# Patient Record
Sex: FEMALE | Race: OTHER | HISPANIC OR LATINO | URBAN - METROPOLITAN AREA
[De-identification: names, ages, dates, MRNs, and addresses within clinical notes are randomized per-mention and may not be internally consistent; named-entity substitution may affect disease eponyms.]

---

## 2021-12-03 ENCOUNTER — INPATIENT (INPATIENT)
Facility: HOSPITAL | Age: 31
LOS: 1 days | Discharge: HOME | DRG: 812 | End: 2021-12-05
Attending: INTERNAL MEDICINE | Admitting: FAMILY MEDICINE
Payer: SELF-PAY

## 2021-12-03 VITALS — HEIGHT: 63 IN | WEIGHT: 119.93 LBS

## 2021-12-03 DIAGNOSIS — D64.9 ANEMIA, UNSPECIFIED: ICD-10-CM

## 2021-12-03 DIAGNOSIS — Z98.891 HISTORY OF UTERINE SCAR FROM PREVIOUS SURGERY: Chronic | ICD-10-CM

## 2021-12-03 LAB
ADD ON TEST-SPECIMEN IN LAB: SIGNIFICANT CHANGE UP
ALBUMIN SERPL ELPH-MCNC: 4.1 G/DL — SIGNIFICANT CHANGE UP (ref 3.3–5)
ALP SERPL-CCNC: 55 U/L — SIGNIFICANT CHANGE UP (ref 40–120)
ALT FLD-CCNC: 32 U/L — SIGNIFICANT CHANGE UP (ref 12–78)
ANION GAP SERPL CALC-SCNC: 7 MMOL/L — SIGNIFICANT CHANGE UP (ref 5–17)
AST SERPL-CCNC: 43 U/L — HIGH (ref 15–37)
BASOPHILS # BLD AUTO: 0.02 K/UL — SIGNIFICANT CHANGE UP (ref 0–0.2)
BASOPHILS NFR BLD AUTO: 0.5 % — SIGNIFICANT CHANGE UP (ref 0–2)
BILIRUB SERPL-MCNC: 0.3 MG/DL — SIGNIFICANT CHANGE UP (ref 0.2–1.2)
BUN SERPL-MCNC: 10 MG/DL — SIGNIFICANT CHANGE UP (ref 7–23)
CALCIUM SERPL-MCNC: 8.8 MG/DL — SIGNIFICANT CHANGE UP (ref 8.5–10.1)
CHLORIDE SERPL-SCNC: 105 MMOL/L — SIGNIFICANT CHANGE UP (ref 96–108)
CO2 SERPL-SCNC: 24 MMOL/L — SIGNIFICANT CHANGE UP (ref 22–31)
CREAT SERPL-MCNC: 0.61 MG/DL — SIGNIFICANT CHANGE UP (ref 0.5–1.3)
EOSINOPHIL # BLD AUTO: 0.03 K/UL — SIGNIFICANT CHANGE UP (ref 0–0.5)
EOSINOPHIL NFR BLD AUTO: 0.8 % — SIGNIFICANT CHANGE UP (ref 0–6)
GLUCOSE SERPL-MCNC: 90 MG/DL — SIGNIFICANT CHANGE UP (ref 70–99)
HCT VFR BLD CALC: 23.3 % — LOW (ref 34.5–45)
HGB BLD-MCNC: 5.8 G/DL — CRITICAL LOW (ref 11.5–15.5)
IMM GRANULOCYTES NFR BLD AUTO: 0.3 % — SIGNIFICANT CHANGE UP (ref 0–1.5)
LYMPHOCYTES # BLD AUTO: 1.08 K/UL — SIGNIFICANT CHANGE UP (ref 1–3.3)
LYMPHOCYTES # BLD AUTO: 28.3 % — SIGNIFICANT CHANGE UP (ref 13–44)
MAGNESIUM SERPL-MCNC: 1.7 MG/DL — SIGNIFICANT CHANGE UP (ref 1.6–2.6)
MCHC RBC-ENTMCNC: 17.4 PG — LOW (ref 27–34)
MCHC RBC-ENTMCNC: 24.9 GM/DL — LOW (ref 32–36)
MCV RBC AUTO: 70 FL — LOW (ref 80–100)
MONOCYTES # BLD AUTO: 0.42 K/UL — SIGNIFICANT CHANGE UP (ref 0–0.9)
MONOCYTES NFR BLD AUTO: 11 % — SIGNIFICANT CHANGE UP (ref 2–14)
NEUTROPHILS # BLD AUTO: 2.26 K/UL — SIGNIFICANT CHANGE UP (ref 1.8–7.4)
NEUTROPHILS NFR BLD AUTO: 59.1 % — SIGNIFICANT CHANGE UP (ref 43–77)
NT-PROBNP SERPL-SCNC: 62 PG/ML — SIGNIFICANT CHANGE UP (ref 0–125)
PLATELET # BLD AUTO: 382 K/UL — SIGNIFICANT CHANGE UP (ref 150–400)
POTASSIUM SERPL-MCNC: 3.7 MMOL/L — SIGNIFICANT CHANGE UP (ref 3.5–5.3)
POTASSIUM SERPL-SCNC: 3.7 MMOL/L — SIGNIFICANT CHANGE UP (ref 3.5–5.3)
PROT SERPL-MCNC: 9.4 GM/DL — HIGH (ref 6–8.3)
RBC # BLD: 3.33 M/UL — LOW (ref 3.8–5.2)
RBC # FLD: 18.6 % — HIGH (ref 10.3–14.5)
SARS-COV-2 RNA SPEC QL NAA+PROBE: SIGNIFICANT CHANGE UP
SODIUM SERPL-SCNC: 136 MMOL/L — SIGNIFICANT CHANGE UP (ref 135–145)
TROPONIN I, HIGH SENSITIVITY RESULT: 9.06 NG/L — SIGNIFICANT CHANGE UP
WBC # BLD: 3.82 K/UL — SIGNIFICANT CHANGE UP (ref 3.8–10.5)
WBC # FLD AUTO: 3.82 K/UL — SIGNIFICANT CHANGE UP (ref 3.8–10.5)

## 2021-12-03 PROCEDURE — 86921 COMPATIBILITY TEST INCUBATE: CPT

## 2021-12-03 PROCEDURE — 99285 EMERGENCY DEPT VISIT HI MDM: CPT

## 2021-12-03 PROCEDURE — 86870 RBC ANTIBODY IDENTIFICATION: CPT

## 2021-12-03 PROCEDURE — 83735 ASSAY OF MAGNESIUM: CPT

## 2021-12-03 PROCEDURE — 84100 ASSAY OF PHOSPHORUS: CPT

## 2021-12-03 PROCEDURE — 83540 ASSAY OF IRON: CPT

## 2021-12-03 PROCEDURE — 83550 IRON BINDING TEST: CPT

## 2021-12-03 PROCEDURE — P9016: CPT

## 2021-12-03 PROCEDURE — 82728 ASSAY OF FERRITIN: CPT

## 2021-12-03 PROCEDURE — 84702 CHORIONIC GONADOTROPIN TEST: CPT

## 2021-12-03 PROCEDURE — 84443 ASSAY THYROID STIM HORMONE: CPT

## 2021-12-03 PROCEDURE — 85730 THROMBOPLASTIN TIME PARTIAL: CPT

## 2021-12-03 PROCEDURE — 86922 COMPATIBILITY TEST ANTIGLOB: CPT

## 2021-12-03 PROCEDURE — 36415 COLL VENOUS BLD VENIPUNCTURE: CPT

## 2021-12-03 PROCEDURE — 86039 ANTINUCLEAR ANTIBODIES (ANA): CPT

## 2021-12-03 PROCEDURE — 85610 PROTHROMBIN TIME: CPT

## 2021-12-03 PROCEDURE — 82746 ASSAY OF FOLIC ACID SERUM: CPT

## 2021-12-03 PROCEDURE — 82607 VITAMIN B-12: CPT

## 2021-12-03 PROCEDURE — 85027 COMPLETE CBC AUTOMATED: CPT

## 2021-12-03 PROCEDURE — 93010 ELECTROCARDIOGRAM REPORT: CPT

## 2021-12-03 PROCEDURE — 85025 COMPLETE CBC W/AUTO DIFF WBC: CPT

## 2021-12-03 PROCEDURE — 86200 CCP ANTIBODY: CPT

## 2021-12-03 PROCEDURE — 80053 COMPREHEN METABOLIC PANEL: CPT

## 2021-12-03 PROCEDURE — 86902 BLOOD TYPE ANTIGEN DONOR EA: CPT

## 2021-12-03 PROCEDURE — 84466 ASSAY OF TRANSFERRIN: CPT

## 2021-12-03 PROCEDURE — 71045 X-RAY EXAM CHEST 1 VIEW: CPT | Mod: 26

## 2021-12-03 PROCEDURE — 86431 RHEUMATOID FACTOR QUANT: CPT

## 2021-12-03 PROCEDURE — 36430 TRANSFUSION BLD/BLD COMPNT: CPT

## 2021-12-03 PROCEDURE — 82533 TOTAL CORTISOL: CPT

## 2021-12-03 PROCEDURE — 83020 HEMOGLOBIN ELECTROPHORESIS: CPT | Mod: 26

## 2021-12-03 PROCEDURE — 86920 COMPATIBILITY TEST SPIN: CPT

## 2021-12-03 PROCEDURE — 99223 1ST HOSP IP/OBS HIGH 75: CPT

## 2021-12-03 PROCEDURE — 83020 HEMOGLOBIN ELECTROPHORESIS: CPT

## 2021-12-03 PROCEDURE — 76856 US EXAM PELVIC COMPLETE: CPT

## 2021-12-03 RX ORDER — DIPHENHYDRAMINE HCL 50 MG
25 CAPSULE ORAL ONCE
Refills: 0 | Status: DISCONTINUED | OUTPATIENT
Start: 2021-12-03 | End: 2021-12-03

## 2021-12-03 RX ORDER — SODIUM CHLORIDE 9 MG/ML
250 INJECTION INTRAMUSCULAR; INTRAVENOUS; SUBCUTANEOUS ONCE
Refills: 0 | Status: COMPLETED | OUTPATIENT
Start: 2021-12-03 | End: 2021-12-03

## 2021-12-03 RX ADMIN — SODIUM CHLORIDE 250 MILLILITER(S): 9 INJECTION INTRAMUSCULAR; INTRAVENOUS; SUBCUTANEOUS at 18:30

## 2021-12-03 RX ADMIN — SODIUM CHLORIDE 500 MILLILITER(S): 9 INJECTION INTRAMUSCULAR; INTRAVENOUS; SUBCUTANEOUS at 17:30

## 2021-12-03 NOTE — H&P ADULT - NSICDXFAMILYHX_GEN_ALL_CORE_FT
FAMILY HISTORY:  FH: diabetes mellitus    Father  Still living? Unknown  Family history of hypertension, Age at diagnosis: Age Unknown    Mother  Still living? Unknown  Family history of hypothyroidism, Age at diagnosis: Age Unknown    Sibling  Still living? Unknown  Family history of hypothyroidism, Age at diagnosis: Age Unknown    Aunt  Still living? Unknown  Family history of hypothyroidism, Age at diagnosis: Age Unknown

## 2021-12-03 NOTE — ED ADULT NURSE NOTE - OBJECTIVE STATEMENT
pt presents to ED s/p low H&H performed at [facility]. Pt endorses ongoing fatigue, SOB and dizziness. Hx anemia and 3 blood transfusions. Reports that patient has chronic anemia from heavy long lasting menstruation. Pt denies menstruating at this time. Pt on the monitor, in no acute distress, will ctm

## 2021-12-03 NOTE — ED PROVIDER NOTE - NS_ ATTENDINGSCRIBEDETAILS _ED_A_ED_FT
I Mark Marx MD saw and examined the patient. Scribe documented for me and under my supervision. I have modified the scribe's documentation where necessary to reflect my history, physical exam and other relevant documentations pertinent to the care of the patient.

## 2021-12-03 NOTE — H&P ADULT - NSHPREVIEWOFSYSTEMS_GEN_ALL_CORE
Gen: + fevers, chills, weight loss, weight gain, malaise, fatigue  Eyes: + some photophobia. No blurred vision or lacrimation  ENT: no tinnitus or decreased hearing  Resp: +dyspnea. No wheezing, pleuritic chest pain, or hemoptysis  CV: +GARZA. No chest pain or palpitations  GI: no nausea, vomiting, abdominal pain, diarrhea, constipation, melena, or hematochezia  : no dysuria, hematuria, or incontinence  MSK: + arthralgias. No joint swelling or myalgias  Neuro: + headache, dizziness, weakness. No focal deficits, confusion, tremors, or seizures  Skin: no rash, lesions, or edema

## 2021-12-03 NOTE — H&P ADULT - ASSESSMENT
32 yo mostly Georgian-speaking F with a PMH reported iron deficiency anemia and hypothyroidism (not on meds) who presents with SOB and dizziness/SOB, found to be significantly anemic.    #Symptomatic (Microcytic) Anemia/Metrorrhagia  - Patient has had longstanding anemia for years  - Reportedly iron deficiency due to menorrhagia  - Now having aberrant menstrual periods, which may be in the setting of anemia, but would recommend pelvic US regardless  - Patient presented with Hb 5.8, appears to be at baseline, but is very symptomatic with dyspnea on exertion and at rest, dizziness, weight changes, and fatigue  - MCV low at 70  - LDH mildly elevated at 277  - F/u haptoglobin and reticulocyte count  - FOBT negative  - F/u ferritin. Added iron studies prior to transfusion  - Check B12 and folate  - Check hemoglobin electrophoresis to evaluate for thalassemia  - F/u TSH (not on meds)  - No other cytopenias seen  - Would ordinarily transfuse for symptomatic anemia, but patient is Silvano positive (IgG positive, complement negative) and has numerous antibodies (eluate panagglutinin). ED spoke with blood bank, who would need to type and cross patient with the correct blood and will take 1-2 days (has to be sent out to the city). Currently patient is stable and can be admitted to Med/Surg with Q4H vitals monitoring. However, if patient clinically decompensates (i.e worsening SOB, dizziness), then may need more urgent transfusion and would consider transfusing type O blood in a closely monitored setting (i.e. CICU)  - No prior transfusion reactions  - Given antibodies above, may well have AIHA, but unclear if this is the major cause for her anemia vs minor  - Peripheral smear _________________  - Hematology consult    #Hypothyroidism  - Reportedly  - May be playing a role in anemia. Not on meds  - Check TSH    #Prophylactic measure  - DVT PPX: IMPROVE score of 0, no PPX needed  - Diet: regular  - Dispo: pending transfusion and hematology eval 32 yo mostly Jordanian-speaking F with a PMH reported iron deficiency anemia and hypothyroidism (not on meds) who presents with SOB and dizziness/SOB, found to be significantly anemic.    #Symptomatic (Microcytic) Anemia/Metrorrhagia  - Patient has had longstanding anemia for years  - Reportedly iron deficiency due to menorrhagia  - Now having aberrant menstrual periods, which may be in the setting of anemia, but would recommend pelvic US regardless  - Patient presented with Hb 5.8, appears to be at baseline, but is very symptomatic with dyspnea on exertion and at rest, dizziness, weight changes, and fatigue  - MCV low at 70  - LDH mildly elevated at 277  - F/u haptoglobin and reticulocyte count  - FOBT negative  - F/u ferritin. Added iron studies prior to transfusion  - Check B12 and folate  - Check hemoglobin electrophoresis to evaluate for thalassemia  - F/u TSH (not on meds)  - No other cytopenias seen  - Would ordinarily transfuse for symptomatic anemia, but patient is Silvano positive (IgG positive, complement negative) and has numerous antibodies (eluate panagglutinin). ED spoke with blood bank, who would need to type and cross patient with the correct blood and will take 1-2 days (has to be sent out to the city). Currently patient is stable and can be admitted to Med/Surg with Q4H vitals monitoring. However, if patient clinically decompensates (i.e worsening SOB, dizziness), then may need more urgent transfusion and would consider transfusing type O blood in a closely monitored setting (i.e. CICU)  - No prior transfusion reactions  - Given antibodies above, may well have AIHA, but unclear if this is the major cause for her anemia vs minor  - Peripheral smear _________________  - Hematology consult    #Hypothyroidism  - Reportedly  - May be playing a role in anemia. Not on meds  - Check TSH    #Intermittent Fever  - Patient with reported fevers as high as 103F outpatient  - With frequent joint pains  - Unclear if may be related to irregular menstruation vs thyroid disease  - WBC normal  - Check TSH  - Check OZZY, rheumatoid factor, anti-CCP  - Check AM cortisol levels as well (has a history of hypotension)  - Vital signs Q4H    #Prophylactic measure  - DVT PPX: IMPROVE score of 0, no PPX needed  - Diet: regular  - Dispo: pending transfusion and hematology eval 30 yo mostly Togolese-speaking F with a PMH reported iron deficiency anemia and hypothyroidism (not on meds) who presents with SOB and dizziness/SOB, found to be significantly anemic.    #Symptomatic (Microcytic) Anemia/Metrorrhagia  - Patient has had longstanding anemia for years  - Reportedly iron deficiency due to menorrhagia  - Now having aberrant menstrual periods, which may be in the setting of anemia, but would recommend pelvic US regardless  - Patient presented with Hb 5.8, appears to be at baseline, but is very symptomatic with dyspnea on exertion and at rest, dizziness, weight changes, and fatigue  - MCV low at 70  - LDH mildly elevated at 277  - F/u haptoglobin and reticulocyte count  - FOBT negative  - F/u ferritin. Added iron studies prior to transfusion  - Check B12 and folate  - Check hemoglobin electrophoresis to evaluate for thalassemia  - F/u TSH (not on meds)  - No other cytopenias seen  - Peripheral smear personally reviewed. Microcytic, hypochromic cells with some poikilocytosis, elliptocytes. Occasional micr-spherocytes. No schistocytes. Occasional giant platelets as well. No dysplastic appearing WBCs seen.  - Would ordinarily transfuse for symptomatic anemia, but patient is Silvano positive (IgG positive, complement negative) and has numerous antibodies (eluate panagglutinin). ED spoke with blood bank, who would need to type and cross patient with the correct blood and will take 1-2 days (has to be sent out to the city). Currently patient is stable and can be admitted to Med/Surg with Q4H vitals monitoring. However, if patient clinically decompensates (i.e worsening SOB, dizziness), then may need more urgent transfusion and would consider transfusing type O blood in a closely monitored setting (i.e. CICU)  - No prior transfusion reactions  - Given antibodies above and peripheral smear, may well have AIHA, but unclear if this is the major cause for her anemia vs minor  - Hematology consult    #Hypothyroidism  - Reportedly  - May be playing a role in anemia. Not on meds  - Check TSH    #Intermittent Fever  - Patient with reported fevers as high as 103F outpatient  - With frequent joint pains  - Unclear if may be related to irregular menstruation vs thyroid disease  - WBC normal  - Check TSH  - Check OZZY, rheumatoid factor, anti-CCP  - Check AM cortisol levels as well (has a history of hypotension)  - Vital signs Q4H    #Prophylactic measure  - DVT PPX: IMPROVE score of 0, no PPX needed  - Diet: regular  - Dispo: pending transfusion and hematology eval

## 2021-12-03 NOTE — ED PROVIDER NOTE - CLINICAL SUMMARY MEDICAL DECISION MAKING FREE TEXT BOX
30 y/o female with a PMHx of 2x blood transfusion presents to the ED c/o low HGB level of 4.9. Plan: labs,  rectal exam (negative), CXR, brisk hydration and reassess. 32 y/o female with a PMHx of 2x blood transfusion presents to the ED c/o low HGB level of 4.9. Plan: labs,  rectal exam (negative), CXR, brisk hydration and reassess.    Araseli POSEY: Will admit as blood can not be readily crossed and matched, currently no hemodynamic instability so will admit pending transfusion.

## 2021-12-03 NOTE — H&P ADULT - NSHPLABSRESULTS_GEN_ALL_CORE
Labs personally reviewed and interpreted. Notable for no leukocytosis (WBC 3.82), left shift, or lymphopenia. Hb low at 5.8 with MCV 70, plt 382, Na 136, K 3.7, Cl 105, HCO3 24, BUN/creatinine 10/0.61, BG 90, calcium 8.8 with albumin 4.1, total protein elevated at 9.4, Mg 1.7, tbili normal at 0.3, alk phos normal at 55, AST slightly elevated at 43, ALT normal at 32, LDH slightly elevated at 277, uric acid normal at 3.1, and troponin I normal at 9.06 x1.  Patient is positive for direct Silvano antiglobulins, IgG positive and complement negative; eluate panagglutinin.  COVID-19 PCR result negative.    CXR personally reviewed and interpreted. Notable for clear lungs, no focal consolidations, effusions, interstitial markings, pneumothorax, or obvious cardiomegaly.    EKG personally reviewed. Normal sinus rhythm, normal axis. No pathological Q waves or ST changes. Rate _______________________ Labs personally reviewed and interpreted. Notable for no leukocytosis (WBC 3.82), left shift, or lymphopenia. Hb low at 5.8 with MCV 70, plt 382, Na 136, K 3.7, Cl 105, HCO3 24, BUN/creatinine 10/0.61, BG 90, calcium 8.8 with albumin 4.1, total protein elevated at 9.4, Mg 1.7, tbili normal at 0.3, alk phos normal at 55, AST slightly elevated at 43, ALT normal at 32, LDH slightly elevated at 277, uric acid normal at 3.1, and troponin I normal at 9.06 x1.  Patient is positive for direct Silvano antiglobulins, IgG positive and complement negative; eluate panagglutinin.  COVID-19 PCR result negative.    CXR personally reviewed and interpreted. Notable for clear lungs, no focal consolidations, effusions, interstitial markings, pneumothorax, or obvious cardiomegaly.    EKG personally reviewed. Normal sinus rhythm, normal axis. No pathological Q waves or ST elevations or depressions. TWIs in aVL and V1-4. Rate 76, , QTc 432.

## 2021-12-03 NOTE — PHARMACOTHERAPY INTERVENTION NOTE - COMMENTS
Medication History Complete. Medications and allergies reviewed with patient and compared to [place]. Al medication related questions answered.

## 2021-12-03 NOTE — ED PROVIDER NOTE - MUSCULOSKELETAL, MLM
Spine appears normal, range of motion is not limited, no muscle or joint tenderness Spine appears normal, range of motion is not limited, no muscle or joint tenderness. 5/5 strength on flexion and extension of all limbs. No nuchal rigidity. no saddle anesthesia.

## 2021-12-03 NOTE — ED PROVIDER NOTE - OBJECTIVE STATEMENT
30 y/o female with a PMHx of 2x blood transfusion presents to the ED c/o low HGB of 4.9 requesting blood transfusion. States she went to [facility] for fatigue and knee discomfort for the past few weeks.  Sent by [facility]. +SOB. Denies heavy vaginal bleeding, bleeding from rectum.  LMP in 08/2021 which lasts 6-9 days. No possibility of being pregnant.  Denies any testing for ID. Has a OBGYN in [place]. ED RN, Cheyenne Springer served as . 32 y/o female with a PMHx of 2x blood transfusion presents to the ED c/o low HGB of 4.9 requesting blood transfusion. States she went to [facility] for fatigue and knee discomfort for the past few weeks.  Sent by [facility]. +SOB. Denies heavy vaginal bleeding, bleeding from rectum.  LMP in 08/2021 which lasts 6-9 days. No possibility of being pregnant.  No tick bite. No recent dx of any infectious dx that she is aware of. Has a OBGYN in [place]. ED RN, Cheyenne Springer served as . No cp, palpitation, abdominal pain, recent trauma, visual or focal neurological complaints. No saddle anesthesia.

## 2021-12-03 NOTE — H&P ADULT - NSICDXPASTMEDICALHX_GEN_ALL_CORE_FT
PAST MEDICAL HISTORY:  Anemia reportedly iron deficiency anemia    Hypothyroidism not on meds    Low blood pressure history of      History of blood transfusion

## 2021-12-03 NOTE — ED PROVIDER NOTE - PROGRESS NOTE DETAILS
Bowen Collier for attending Dr. Marx: Guaiac test: Lot [address] , Exp:08/31/22 , Guaiac negative, QC passed. Araseli POSEY: Plan was to transfuse patient and dc patient, however lab contacted us that patient has multiple antibodies in the blood and they can not readily cross and match blood for patient and that patient would requires her blood sent to a specialized lab in the city to cross and match for the correct blood. The risk of blood transfusion reactions is high due to her cross and match issues. Patient also has SOB, and symptomatic anemia so can not just dc patient for this. Spoke with lab who states it might take up to a day for the results to return. Will admit patient to medicine as we can not board patient in the ED for a day or more, will admit pending transfusion and hematomlogy eval. Bowen Collier for attending Dr. Marx: Guaiac test: Lot [address] , Exp:08/31/22 , Guaiac negative, QC passed. Chaperoned by female staff/RN. Verbal consent prior and description of testing, patient agreeable, no complaint/discomfort during process. Araseli POSEY: Plan was to transfuse patient and dc patient, however lab contacted us that patient has multiple antibodies in the blood and they can not readily cross and match blood for patient and that patient would requires her blood sent to a specialized lab in the city to cross and match for the correct blood. The risk of blood transfusion reactions is high due to her cross and match issues. Patient also has SOB, and symptomatic anemia so can not just dc patient for this. Spoke with lab who states it might take up to a day for the results to return. Will admit patient to medicine as we can not board patient in the ED for a day or more, will admit pending transfusion and hematology eval. Hospitalist admission is appreciated.

## 2021-12-03 NOTE — ED ADULT NURSE REASSESSMENT NOTE - NS ED NURSE REASSESS COMMENT FT1
As per lab, pt requires further testing due to presence of antibodies present. As per valeriano henderson from labs extra labs drawn by phlebotomist will be spent to specialty lab in [facility] for evaluation. Pt will not be receiving blood transfusion overnight. MD delgado notified. pt aware and agreeable to admission. in no acute distress. will ctm As per lab, pt requires further testing due to presence of antibodies present. As per valeriano henderson from labs extra labs drawn by phlebotomist will be spent to specialty lab in [facility] for evaluation. Confirmatory type and screen not necessary at this time. Pt will not be receiving blood transfusion overnight. MD delgado notified. pt aware and agreeable to admission. in no acute distress. will ctm

## 2021-12-03 NOTE — ED PROVIDER NOTE - PLAN OF CARE
Unable to cross and match in the ED, multiple blood antibodies present, requires accurate type and match, patient not safe to go home as sympotomatic anemioa, also likely has hematological or other unknown reason for her to be so anemic will admit for further inpatient care and transfusion.

## 2021-12-03 NOTE — ED PROVIDER NOTE - CARE PLAN
Principal Discharge DX:	Symptomatic anemia   1 Principal Discharge DX:	Symptomatic anemia  Goal:	Unable to cross and match in the ED, multiple blood antibodies present, requires accurate type and match, patient not safe to go home as sympotomatic anemioa, also likely has hematological or other unknown reason for her to be so anemic will admit for further inpatient care and transfusion.

## 2021-12-03 NOTE — H&P ADULT - NSHPPHYSICALEXAM_GEN_ALL_CORE
Vital Signs Last 24 Hrs  T(C): 36.9 (03 Dec 2021 22:26), Max: 36.9 (03 Dec 2021 16:39)  T(F): 98.4 (03 Dec 2021 22:26), Max: 98.5 (03 Dec 2021 16:39)  HR: 86 (03 Dec 2021 22:26) (79 - 86)  BP: 113/63 (03 Dec 2021 22:26) (113/63 - 118/77)  BP(mean): 91 (03 Dec 2021 16:39) (91 - 91)  RR: 18 (03 Dec 2021 16:39) (18 - 18)  SpO2: 100% (03 Dec 2021 16:39) (100% - 100%)    GENERAL: No acute distress  HEENT: PERRL, EOMI, MMM, no oropharyngeal lesions  NECK: supple, no stiffness, no JVD, no thyromegaly  PULM: respirations non-labored, clear to auscultation bilaterally, no rales, rhonchi, or wheezes  CV: regular rate and rhythm, no murmurs, gallops, or rubs  GI: abdomen soft, nontender, nondistended, no masses felt, normal bowel sounds  MSK: strength 5/5 bilateral upper/lower extremities. No joint swelling, erythema, or warmth.  LYMPH: no anterior cervical, posterior cervical, supraclavicular, or inguinal lymphadenopathy  NEURO: A&Ox3, no tremors, sensation intact  SKIN: no rashes, lesions, or edema

## 2021-12-03 NOTE — ED ADULT TRIAGE NOTE - CHIEF COMPLAINT QUOTE
pt ambulatory to ED c/o low HGB 4.9, sent in by [facility] for transfusion. +SOB and lethargy. hx of blood transfusion

## 2021-12-04 DIAGNOSIS — D64.9 ANEMIA, UNSPECIFIED: ICD-10-CM

## 2021-12-04 DIAGNOSIS — R76.8 OTHER SPECIFIED ABNORMAL IMMUNOLOGICAL FINDINGS IN SERUM: ICD-10-CM

## 2021-12-04 DIAGNOSIS — D50.0 IRON DEFICIENCY ANEMIA SECONDARY TO BLOOD LOSS (CHRONIC): ICD-10-CM

## 2021-12-04 DIAGNOSIS — R42 DIZZINESS AND GIDDINESS: ICD-10-CM

## 2021-12-04 LAB
ADD ON TEST-SPECIMEN IN LAB: SIGNIFICANT CHANGE UP
ADD ON TEST-SPECIMEN IN LAB: SIGNIFICANT CHANGE UP
ALBUMIN SERPL ELPH-MCNC: 3.6 G/DL — SIGNIFICANT CHANGE UP (ref 3.3–5)
ALP SERPL-CCNC: 52 U/L — SIGNIFICANT CHANGE UP (ref 40–120)
ALT FLD-CCNC: 28 U/L — SIGNIFICANT CHANGE UP (ref 12–78)
ANION GAP SERPL CALC-SCNC: 7 MMOL/L — SIGNIFICANT CHANGE UP (ref 5–17)
APTT BLD: 29.6 SEC — SIGNIFICANT CHANGE UP (ref 27.5–35.5)
AST SERPL-CCNC: 25 U/L — SIGNIFICANT CHANGE UP (ref 15–37)
BASOPHILS # BLD AUTO: 0.01 K/UL — SIGNIFICANT CHANGE UP (ref 0–0.2)
BASOPHILS NFR BLD AUTO: 0.4 % — SIGNIFICANT CHANGE UP (ref 0–2)
BILIRUB SERPL-MCNC: 0.3 MG/DL — SIGNIFICANT CHANGE UP (ref 0.2–1.2)
BUN SERPL-MCNC: 9 MG/DL — SIGNIFICANT CHANGE UP (ref 7–23)
CALCIUM SERPL-MCNC: 8.4 MG/DL — LOW (ref 8.5–10.1)
CHLORIDE SERPL-SCNC: 106 MMOL/L — SIGNIFICANT CHANGE UP (ref 96–108)
CO2 SERPL-SCNC: 24 MMOL/L — SIGNIFICANT CHANGE UP (ref 22–31)
CORTIS AM PEAK SERPL-MCNC: 8.6 UG/DL — SIGNIFICANT CHANGE UP (ref 6–18.4)
CREAT SERPL-MCNC: 0.61 MG/DL — SIGNIFICANT CHANGE UP (ref 0.5–1.3)
EOSINOPHIL # BLD AUTO: 0.03 K/UL — SIGNIFICANT CHANGE UP (ref 0–0.5)
EOSINOPHIL NFR BLD AUTO: 1.3 % — SIGNIFICANT CHANGE UP (ref 0–6)
FOLATE SERPL-MCNC: 15 NG/ML — SIGNIFICANT CHANGE UP
GLUCOSE SERPL-MCNC: 83 MG/DL — SIGNIFICANT CHANGE UP (ref 70–99)
HCG SERPL-ACNC: <1 MIU/ML — SIGNIFICANT CHANGE UP
HCT VFR BLD CALC: 20.6 % — CRITICAL LOW (ref 34.5–45)
HGB BLD-MCNC: 5.2 G/DL — CRITICAL LOW (ref 11.5–15.5)
IMM GRANULOCYTES NFR BLD AUTO: 0 % — SIGNIFICANT CHANGE UP (ref 0–1.5)
INR BLD: 1.18 RATIO — HIGH (ref 0.88–1.16)
IRON SATN MFR SERPL: 11 UG/DL — LOW (ref 30–160)
IRON SATN MFR SERPL: 11 UG/DL — LOW (ref 30–160)
IRON SATN MFR SERPL: 2 % — LOW (ref 14–50)
LYMPHOCYTES # BLD AUTO: 0.81 K/UL — LOW (ref 1–3.3)
LYMPHOCYTES # BLD AUTO: 36 % — SIGNIFICANT CHANGE UP (ref 13–44)
MAGNESIUM SERPL-MCNC: 1.8 MG/DL — SIGNIFICANT CHANGE UP (ref 1.6–2.6)
MCHC RBC-ENTMCNC: 17.5 PG — LOW (ref 27–34)
MCHC RBC-ENTMCNC: 25.2 GM/DL — LOW (ref 32–36)
MCV RBC AUTO: 69.4 FL — LOW (ref 80–100)
MONOCYTES # BLD AUTO: 0.36 K/UL — SIGNIFICANT CHANGE UP (ref 0–0.9)
MONOCYTES NFR BLD AUTO: 16 % — HIGH (ref 2–14)
NEUTROPHILS # BLD AUTO: 1.04 K/UL — LOW (ref 1.8–7.4)
NEUTROPHILS NFR BLD AUTO: 46.3 % — SIGNIFICANT CHANGE UP (ref 43–77)
PHOSPHATE SERPL-MCNC: 3.5 MG/DL — SIGNIFICANT CHANGE UP (ref 2.5–4.5)
PLATELET # BLD AUTO: 293 K/UL — SIGNIFICANT CHANGE UP (ref 150–400)
POTASSIUM SERPL-MCNC: 3.6 MMOL/L — SIGNIFICANT CHANGE UP (ref 3.5–5.3)
POTASSIUM SERPL-SCNC: 3.6 MMOL/L — SIGNIFICANT CHANGE UP (ref 3.5–5.3)
PROT SERPL-MCNC: 8.8 GM/DL — HIGH (ref 6–8.3)
PROTHROM AB SERPL-ACNC: 13.7 SEC — HIGH (ref 10.6–13.6)
RBC # BLD: 2.97 M/UL — LOW (ref 3.8–5.2)
RBC # FLD: 18.1 % — HIGH (ref 10.3–14.5)
RHEUMATOID FACT SERPL-ACNC: 15 IU/ML — HIGH (ref 0–13)
SODIUM SERPL-SCNC: 137 MMOL/L — SIGNIFICANT CHANGE UP (ref 135–145)
TIBC SERPL-MCNC: 490 UG/DL — HIGH (ref 220–430)
TSH SERPL-MCNC: 4.07 UU/ML — SIGNIFICANT CHANGE UP (ref 0.34–4.82)
UIBC SERPL-MCNC: 479 UG/DL — HIGH (ref 110–370)
VIT B12 SERPL-MCNC: 477 PG/ML — SIGNIFICANT CHANGE UP (ref 232–1245)
WBC # BLD: 2.25 K/UL — LOW (ref 3.8–10.5)
WBC # FLD AUTO: 2.25 K/UL — LOW (ref 3.8–10.5)

## 2021-12-04 PROCEDURE — 99233 SBSQ HOSP IP/OBS HIGH 50: CPT

## 2021-12-04 PROCEDURE — 76856 US EXAM PELVIC COMPLETE: CPT | Mod: 26

## 2021-12-04 PROCEDURE — 99221 1ST HOSP IP/OBS SF/LOW 40: CPT

## 2021-12-04 RX ORDER — BENZOCAINE AND MENTHOL 5; 1 G/100ML; G/100ML
1 LIQUID ORAL ONCE
Refills: 0 | Status: COMPLETED | OUTPATIENT
Start: 2021-12-04 | End: 2021-12-04

## 2021-12-04 RX ADMIN — BENZOCAINE AND MENTHOL 1 LOZENGE: 5; 1 LIQUID ORAL at 22:20

## 2021-12-04 NOTE — PATIENT PROFILE ADULT - FALL HARM RISK - RISK INTERVENTIONS
Assistance OOB with selected safe patient handling equipment/Assistance with ambulation/Communicate Fall Risk and Risk Factors to all staff, patient, and family/Monitor gait and stability/Move patient closer to nurses' station/Reinforce activity limits and safety measures with patient and family/Sit up slowly, dangle for a short time, stand at bedside before walking/Use of alarms - bed, chair and/or voice tab/Visual Cue: Yellow wristband/Bed in lowest position, wheels locked, appropriate side rails in place/Call bell, personal items and telephone in reach/Instruct patient to call for assistance before getting out of bed or chair/Non-slip footwear when patient is out of bed/[place] to call system/Physically safe environment - no spills, clutter or unnecessary equipment/Purposeful Proactive Rounding/Room/bathroom lighting operational, light cord in reach Assistance OOB with selected safe patient handling equipment/Assistance with ambulation/Communicate Fall Risk and Risk Factors to all staff, patient, and family/Monitor gait and stability/Reinforce activity limits and safety measures with patient and family/Sit up slowly, dangle for a short time, stand at bedside before walking/Visual Cue: Yellow wristband/Bed in lowest position, wheels locked, appropriate side rails in place/Call bell, personal items and telephone in reach/Instruct patient to call for assistance before getting out of bed or chair/Non-slip footwear when patient is out of bed/[place] to call system/Physically safe environment - no spills, clutter or unnecessary equipment/Purposeful Proactive Rounding/Room/bathroom lighting operational, light cord in reach

## 2021-12-04 NOTE — PROGRESS NOTE ADULT - SUBJECTIVE AND OBJECTIVE BOX
Progress Note    Sonogram:    < from: US Pelvis Complete (US Pelvis Complete .) (12.04.21 @ 08:50) >    EXAM:  US PELVIC COMPLETE                            PROCEDURE DATE:  12/04/2021          INTERPRETATION:  CLINICAL INFORMATION: Irregular menstruation    LMP: 10/10/2021    COMPARISON: None available.    TECHNIQUE:  Endovaginal and transabdominal pelvic sonogram. Color and Spectral Doppler was performed.    FINDINGS:    Uterus: 8.4 cm x 4.8 cm x 5.4 cm.  Endometrium: 1.6 cm. Thickened heterogeneous appearance of the endometrium with internal vascularity. An echogenic lesion is present, measuring 0.6 cm.    Right ovary: 2.6 cm x 2.2 cm x 2.0 cm. Cyst, measuring 1.7 x 1.7 x 1.1 cm. Normal arterial and venous waveforms.  Left ovary: 1.5 cm x 1.4 cm x 1.3 cm. Within normal limits. Normal arterial and venous waveforms.    Fluid: None.    IMPRESSION:  Thickened heterogeneous appearance of the endometrial cavity with internal vascularity and suggestion of endometrial polyp. Correlation with tissue sampling is suggested.    A/P No active bleeding since October. Recommend follow-up with [facility] or in [place].              < end of copied text >  
Subjective:  awake and alert    MEDICATIONS  (STANDING):    MEDICATIONS  (PRN):      Allergies    No Known Allergies    Intolerances        REVIEW OF SYSTEMS:    CONSTITUTIONAL:  As per HPI.  HEENT:  Eyes:  No diplopia or blurred vision. ENT:  No earache, sore throat or runny nose.  CARDIOVASCULAR:  No pressure, squeezing, tightness, heaviness or aching about the chest, neck, axilla or epigastrium.  RESPIRATORY:  No cough, shortness of breath, PND or orthopnea.  GASTROINTESTINAL:  No nausea, vomiting or diarrhea.  GENITOURINARY:  No dysuria, frequency or urgency.  MUSCULOSKELETAL:  no joint pain, deformity, tenderness  EXTREMITIES: no clubbing cyanosis,edema  SKIN:  No change in skin, hair or nails.  NEUROLOGIC:  No paresthesias, fasciculations, seizures or weakness.  PSYCHIATRIC:  No disorder of thought or mood.  ENDOCRINE:  No heat or cold intolerance, polyuria or polydipsia.  HEMATOLOGICAL:  No easy bruising or bleedings:    Vital Signs Last 24 Hrs  T(C): 36.7 (04 Dec 2021 04:43), Max: 36.9 (03 Dec 2021 16:39)  T(F): 98 (04 Dec 2021 04:43), Max: 98.5 (03 Dec 2021 16:39)  HR: 84 (04 Dec 2021 04:43) (79 - 86)  BP: 108/60 (04 Dec 2021 04:43) (104/54 - 118/77)  BP(mean): 91 (03 Dec 2021 16:39) (91 - 91)  RR: 18 (04 Dec 2021 04:43) (18 - 19)  SpO2: 100% (04 Dec 2021 04:43) (100% - 100%)    PHYSICAL EXAMINATION:  SKIN: no rashes  HEAD: NC/AT  EYES: PERRLA, EOMI  EARS: TM's intact  NOSE: no abnormalities  NECK:  Supple. No lymphadenopathy. Jugular venous pressure not elevated. Carotids equal.   HEART:   The cardiac impulse has a normal quality. Reg., Nl S1 and S2.  There are no murmurs, rubs or gallops noted  CHEST:  Chest is clear to auscultation. Normal respiratory effort.  ABDOMEN:  Soft and nontender.   EXTREMITIES:  no C/C/E  NEURO: AAO x 3, no focal deficts       LABS:                        5.8    3.82  )-----------( 382      ( 03 Dec 2021 16:56 )             23.3     12-03    136  |  105  |  10  ----------------------------<  90  3.7   |  24  |  0.61    Ca    8.8      03 Dec 2021 16:56  Mg     1.7     12-03    TPro  9.4<H>  /  Alb  4.1  /  TBili  0.3  /  DBili  x   /  AST  43<H>  /  ALT  32  /  AlkPhos  55  12-03          RADIOLOGY & ADDITIONAL TESTS:

## 2021-12-04 NOTE — CONSULT NOTE ADULT - SUBJECTIVE AND OBJECTIVE BOX
30 yo  LMP 10/10/21-10/17/21 presents to hospital with symptomatic anemia, Hgb 5.8. ROS: SOB, fatigue, dizziness. She states that prior to  her menstrual cycles were regular, lasting approximately seven days. +menorrhagia with the passage of clots. +dysmenorrhea. She admits to sometimes bleeding through her clothing. +syncopal episode approximately eight months ago. History of blood transfusion x 3.    GYN Hx: -STDs -PID -fibroids -ovarian cysts. painful, heavy menses- length 7-9 days; changes pads q 2 hours. No menses in July or August. last pap smear - abnormal; colposcopy negative    OB Hx: FT C/S , complicated by blood transfusion in   PMH: SAVANAH, hypothyrodism  PSH: C/S  medications: occasion po Fe, and folic acid  SH: not sexually active. lives in [place]. 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 no CMT. nl utx/NT, no adnexal masses    A/P P1 with SAVANAH, requiring blood transfusion on three occasions and h/o menorrhagia. The patient is presently receiving a blood transfusion. Reviewed options for management of menorrhagia, including OCPs, progesterone IUD, IM progesterone (depo-provera). Risks and benefits reviewed. She is interested in depo-provera. Recommend pelvic ultrasound. Fx D&C, hysteroscopy as out patient. The patient was advised to return to [facility] for her gynecological care, including complete physical and pap smear.

## 2021-12-04 NOTE — CONSULT NOTE ADULT - ASSESSMENT
31 year old female with h/o menorrhagia, iron deficiency anemia, who is admitted with severe anemia, Hgb 5.8 g/dL.   She has a prior history of blood transfusions.   No prior labs available for review of labs.  Ferritin 2 ng/ml. MCV 70. .   Antibody screen positive for anti IgG / anti E / eluate panagglutinin.     She has severe iron deficiency anemia with ferritin 2 ng/ml.   Her symptoms are likely secondary to longstanding anemia rather than an acute drop in Hgb. She is hemodynamically stable.  Given positive antibody screen, potential for transfusion reaction, would instead proceed with parenteral iron.  Start Venofer 300 mg IV daily x 3 days.  Await B12, folate.  31 year old female with h/o menorrhagia, iron deficiency anemia, who is admitted with severe anemia, Hgb 5.8 g/dL.   She has a prior history of blood transfusions.   No prior labs available for review of labs.  Ferritin 2 ng/ml. MCV 70. .   Antibody screen positive for anti IgG / anti E / eluate panagglutinin.     She has severe iron deficiency anemia with ferritin 2 ng/ml.   Her symptoms are likely secondary to longstanding anemia rather than an acute drop in Hgb. She is hemodynamically stable.  Now s/p 2 units prbc transfusion which she tolerated.   Outpatient heme follow up for iron infusions.

## 2021-12-04 NOTE — CONSULT NOTE ADULT - SUBJECTIVE AND OBJECTIVE BOX
REASON FOR CONSULTATION:     HPI:  32 yo mostly Lithuanian-speaking F with a PMH reported iron deficiency anemia and hypothyroidism (not on meds) who presents with SOB and dizziness. She has been having dyspnea, particularly on exertion but also at rest, for about 2 years now, along with lightheadedness and fatigue. 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 Her symptoms have worsened over the last 3 months, and she had not had the time to make a doctor's appointment until now. She is from [place] and came to [place] to visit a friend, but she was seen at the [facility] today was noted to have a Hb of 4.9, and was sent to the ED. Other symptoms she endorses are frontal pulsating headaches that make her sometimes sensitive to light, which can be a couple days in a row, but she can also go weeks without. She also endorses diffuse joint aches that occur in the same frequency, in her wrists, elbows, and knees. She also endorses fevers, as high as 102-103F, chills and sweats, and wide weight fluctuations of up to 30 pounds in the last couple years (although she denies a specific overall weight trend).  She denies nausea, vomiting, CP, diarrhea, constipation, blood in her stool, blood in her urine, or dysuria.     She was diagnosed with iron deficiency anemia about 18 years ago. She was told it was due to heavy periods. She has intermittently taken iron pills and folic acid, but not every day. She states that she had always had periods every month, but occasionally heavy periods (sometimes 8-9 days), until this , when she began to have a month without periods. Her last menstrual period was in August, although she has had spotting in both August and October.  She has had three separate occasions of blood transfusions. The first time was when she had her  (does not know how many units), and the second and third times were 2 units each. Her last transfusion was in 2019. Ever since her anemia diagnosis, her Hb has not been above 9 (and only there after transfusions). Normally, her Hb is 4-5, but has been as low as 3.  She does note she has always bruised easily, sometimes without trauma.    In the ED, she was given 250 ml NS x1. (03 Dec 2021 23:17)      REVIEW OF SYSTEMS:  Constitutional, Eyes, ENT, Cardiovascular, Respiratory, Gastrointestinal, Genitourinary, Musculoskeletal, Integumentary, Neurological, Psychiatric, Endocrine, Heme/Lymph, and Allergic/Immunologic review of systems are otherwise negative except as noted in the HPI.    PAST MEDICAL & SURGICAL HISTORY:  Anemia  reportedly iron deficiency anemia    Hypothyroidism  not on meds    Low blood pressure  history of    History of         FAMILY HISTORY:  Family history of hypothyroidism (Mother, Sibling, Aunt)    Family history of hypertension (Father)    FH: diabetes mellitus        SOCIAL HISTORY:    Allergies    No Known Allergies    Intolerances        MEDICATIONS  (STANDING):    MEDICATIONS  (PRN):      Vital Signs Last 24 Hrs  T(C): 36.7 (04 Dec 2021 04:43), Max: 36.9 (03 Dec 2021 16:39)  T(F): 98 (04 Dec 2021 04:43), Max: 98.5 (03 Dec 2021 16:39)  HR: 84 (04 Dec 2021 04:43) (79 - 86)  BP: 108/60 (04 Dec 2021 04:43) (104/54 - 118/77)  BP(mean): 91 (03 Dec 2021 16:39) (91 - 91)  RR: 18 (04 Dec 2021 04:43) (18 - 19)  SpO2: 100% (04 Dec 2021 04:43) (100% - 100%)    PHYSICAL EXAM:    GENERAL: NAD, well-groomed, well-developed  HEAD:  Atraumatic, Normocephalic  EYES: EOMI, PERRLA, conjunctiva and sclera clear  ENMT: No tonsillar erythema, exudates, or enlargement; Moist mucous membranes, Good dentition, No lesions  NECK: Supple, No JVD, Normal thyroid  NERVOUS SYSTEM:  Alert & Oriented X3, Good concentration; Motor Strength 5/5 B/L upper and lower extremities; DTRs 2+ intact and symmetric  CHEST/LUNG: Clear to auscultation bilaterally; No rales, rhonchi, wheezing, or rubs  HEART: Regular rate and rhythm; No murmurs, rubs, or gallops  ABDOMEN: Soft, Nontender, Nondistended; Bowel sounds present  EXTREMITIES:  2+ Peripheral Pulses, No clubbing, cyanosis, or edema  LYMPH: No lymphadenopathy noted  SKIN: No rashes or lesions      LABS:                        5.8    3.82  )-----------( 382      ( 03 Dec 2021 16:56 )             23.3         136  |  105  |  10  ----------------------------<  90  3.7   |  24  |  0.61    Ca    8.8      03 Dec 2021 16:56  Mg     1.7         TPro  9.4<H>  /  Alb  4.1  /  TBili  0.3  /  DBili  x   /  AST  43<H>  /  ALT  32  /  AlkPhos  55             REASON FOR CONSULTATION:     HPI:  30 yo mostly Yakut-speaking F with a PMH reported iron deficiency anemia and hypothyroidism (not on meds) who presents with SOB and dizziness. She has been having dyspnea, particularly on exertion but also at rest, for about 2 years now, along with lightheadedness and fatigue. 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 Her symptoms have worsened over the last 3 months, and she had not had the time to make a doctor's appointment until now. She is from [place] and came to [place] to visit a friend, but she was seen at the [facility] today was noted to have a Hb of 4.9, and was sent to the ED. Other symptoms she endorses are frontal pulsating headaches that make her sometimes sensitive to light, which can be a couple days in a row, but she can also go weeks without. She also endorses diffuse joint aches that occur in the same frequency, in her wrists, elbows, and knees. She also endorses fevers, as high as 102-103F, chills and sweats, and wide weight fluctuations of up to 30 pounds in the last couple years (although she denies a specific overall weight trend).  She denies nausea, vomiting, CP, diarrhea, constipation, blood in her stool, blood in her urine, or dysuria.     She was diagnosed with iron deficiency anemia about 18 years ago. She was told it was due to heavy periods. She has intermittently taken iron pills and folic acid, but not every day. She states that she had always had periods every month, but occasionally heavy periods (sometimes 8-9 days), until this , when she began to have a month without periods. Her last menstrual period was in August, although she has had spotting in both August and October.  She has had three separate occasions of blood transfusions. The first time was when she had her  (does not know how many units), and the second and third times were 2 units each. Her last transfusion was in 2019. Ever since her anemia diagnosis, her Hb has not been above 9 (and only there after transfusions). Normally, her Hb is 4-5, but has been as low as 3.  She does note she has always bruised easily, sometimes without trauma.    In the ED, she was given 250 ml NS x1. (03 Dec 2021 23:17)      REVIEW OF SYSTEMS:  Constitutional, Eyes, ENT, Cardiovascular, Respiratory, Gastrointestinal, Genitourinary, Musculoskeletal, Integumentary, Neurological, Psychiatric, Endocrine, Heme/Lymph, and Allergic/Immunologic review of systems are otherwise negative except as noted in the HPI.    PAST MEDICAL & SURGICAL HISTORY:  Anemia  reportedly iron deficiency anemia    Hypothyroidism  not on meds    Low blood pressure  history of    History of         FAMILY HISTORY:  Family history of hypothyroidism (Mother, Sibling, Aunt)    Family history of hypertension (Father)    FH: diabetes mellitus        SOCIAL HISTORY:    Allergies    No Known Allergies    Intolerances        MEDICATIONS  (STANDING):    MEDICATIONS  (PRN):      Vital Signs Last 24 Hrs  T(C): 36.7 (04 Dec 2021 04:43), Max: 36.9 (03 Dec 2021 16:39)  T(F): 98 (04 Dec 2021 04:43), Max: 98.5 (03 Dec 2021 16:39)  HR: 84 (04 Dec 2021 04:43) (79 - 86)  BP: 108/60 (04 Dec 2021 04:43) (104/54 - 118/77)  BP(mean): 91 (03 Dec 2021 16:39) (91 - 91)  RR: 18 (04 Dec 2021 04:43) (18 - 19)  SpO2: 100% (04 Dec 2021 04:43) (100% - 100%)    PHYSICAL EXAM:    GENERAL: NAD, well-groomed, well-developed  HEAD:  Atraumatic, Normocephalic  EYES: EOMI, PERRLA,   NECK: Supple, No JVD, Normal thyroid  NERVOUS SYSTEM:  Alert & Oriented X3, Good concentration;   CHEST/LUNG: Clear to auscultation bilaterally;   HEART: Regular rate and rhythm;   ABDOMEN: Soft, Nontender,   EXTREMITIES:  no edema  LYMPH: No lymphadenopathy noted  SKIN: No rashes or lesions      LABS:                        5.8    3.82  )-----------( 382      ( 03 Dec 2021 16:56 )             23.3     12-    136  |  105  |  10  ----------------------------<  90  3.7   |  24  |  0.61    Ca    8.8      03 Dec 2021 16:56  Mg     1.7         TPro  9.4<H>  /  Alb  4.1  /  TBili  0.3  /  DBili  x   /  AST  43<H>  /  ALT  32  /  AlkPhos  55

## 2021-12-04 NOTE — PROGRESS NOTE ADULT - ASSESSMENT
- will recieve 2 units PRBC  - antibody panel positive  - patient w hx heavy menses lasting 8 days. will use 6-10 pads per day  - needs GYN eval  - s/p abdominal US  - labs in am including iron studies  - heme eval  - dvt proph

## 2021-12-05 ENCOUNTER — TRANSCRIPTION ENCOUNTER (OUTPATIENT)
Age: 31
End: 2021-12-05

## 2021-12-05 VITALS
TEMPERATURE: 98 F | SYSTOLIC BLOOD PRESSURE: 97 MMHG | DIASTOLIC BLOOD PRESSURE: 67 MMHG | HEART RATE: 78 BPM | OXYGEN SATURATION: 100 % | RESPIRATION RATE: 17 BRPM

## 2021-12-05 LAB
HCT VFR BLD CALC: 28.5 % — LOW (ref 34.5–45)
HGB BLD-MCNC: 8 G/DL — LOW (ref 11.5–15.5)
MCHC RBC-ENTMCNC: 20.7 PG — LOW (ref 27–34)
MCHC RBC-ENTMCNC: 28.1 GM/DL — LOW (ref 32–36)
MCV RBC AUTO: 73.6 FL — LOW (ref 80–100)
PLATELET # BLD AUTO: 289 K/UL — SIGNIFICANT CHANGE UP (ref 150–400)
RBC # BLD: 3.87 M/UL — SIGNIFICANT CHANGE UP (ref 3.8–5.2)
RBC # FLD: 19.9 % — HIGH (ref 10.3–14.5)
WBC # BLD: 2.98 K/UL — LOW (ref 3.8–10.5)
WBC # FLD AUTO: 2.98 K/UL — LOW (ref 3.8–10.5)

## 2021-12-05 PROCEDURE — 99239 HOSP IP/OBS DSCHRG MGMT >30: CPT

## 2021-12-05 NOTE — DISCHARGE NOTE PROVIDER - CARE PROVIDER_API CALL
Lauryn Lacey)  Hematology; HospicePalliative Medicine; Internal Medicine; Medical Oncology  [address] [place] [address]  Phone: [phone]  Fax: [phone]  Established Patient  Follow Up Time:

## 2021-12-05 NOTE — DISCHARGE NOTE NURSING/CASE MANAGEMENT/SOCIAL WORK - NSDCPEFALRISK_GEN_ALL_CORE
For information on Fall & Injury Prevention, visit: [email or web address] OR  [email or web address] OR  https://www.cdc.gov/steadi/patient.html

## 2021-12-05 NOTE — DISCHARGE NOTE PROVIDER - NSDCCPCAREPLAN_GEN_ALL_CORE_FT
PRINCIPAL DISCHARGE DIAGNOSIS  Diagnosis: Anemia due to chronic blood loss  Assessment and Plan of Treatment:

## 2021-12-05 NOTE — DISCHARGE NOTE NURSING/CASE MANAGEMENT/SOCIAL WORK - PATIENT PORTAL LINK FT
You can access the FollowMyHealth Patient Portal offered by [facility] by registering at the following website: [email or web address]. By joining [employer]’s FollowMyHealth portal, you will also be able to view your health information using other applications (apps) compatible with our system.

## 2021-12-05 NOTE — DISCHARGE NOTE PROVIDER - HOSPITAL COURSE
HPI:  30 yo mostly Slovak-speaking F with a PMH reported iron deficiency anemia and hypothyroidism (not on meds) who presents with SOB and dizziness. She has been having dyspnea, particularly on exertion but also at rest, for about 2 years now, along with lightheadedness and fatigue. 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 Her symptoms have worsened over the last 3 months, and she had not had the time to make a doctor's appointment until now. She is from [place] and came to [place] to visit a friend, but she was seen at the [facility] today was noted to have a Hb of 4.9, and was sent to the ED. Other symptoms she endorses are frontal pulsating headaches that make her sometimes sensitive to light, which can be a couple days in a row, but she can also go weeks without. She also endorses diffuse joint aches that occur in the same frequency, in her wrists, elbows, and knees. She also endorses fevers, as high as 102-103F, chills and sweats, and wide weight fluctuations of up to 30 pounds in the last couple years (although she denies a specific overall weight trend).  She denies nausea, vomiting, CP, diarrhea, constipation, blood in her stool, blood in her urine, or dysuria. She fee    She was diagnosed with iron deficiency anemia about 18 years ago. She was told it was due to heavy periods. She has intermittently taken iron pills and folic acid, but not every day. She states that she had always had periods every month, but occasionally heavy periods (sometimes 8-9 days), until this , when she began to have a month without periods. Her last menstrual period was in August, although she has had spotting in both August and October.  She has had three separate occasions of blood transfusions. The first time was when she had her  (does not know how many units), and the second and third times were 2 units each. Her last transfusion was in 2019. Ever since her anemia diagnosis, her Hb has not been above 9 (and only there after transfusions). Normally, her Hb is 4-5, but has been as low as 3.  12/4: s/p 2 units PRBC. Had gyn and hematology evaluation. Has severe iron deficiency anemia. Will need iv iron as per heme. Follow up w gyn as well.      PAST MEDICAL & SURGICAL HISTORY:  Anemia  reportedly iron deficiency anemia    Hypothyroidism  not on meds    Low blood pressure  history of    Anemia  reportedly iron deficiency anemia    History of             Allergies    No Known Allergies    Intolerances    SOCIAL HISTORY: Denies tobacco, etoh abuse or illicit drug use    Vital Signs Last 24 Hrs  T(C): 36.8 (05 Dec 2021 08:05), Max: 37.5 (04 Dec 2021 14:41)  T(F): 98.2 (05 Dec 2021 08:05), Max: 99.5 (04 Dec 2021 14:41)  HR: 78 (05 Dec 2021 08:05) (76 - 89)  BP: 97/67 (05 Dec 2021 08:05) (97/67 - 131/69)  BP(mean): --  RR: 17 (05 Dec 2021 08:05) (17 - 18)  SpO2: 100% (05 Dec 2021 08:05) (100% - 100%)    REVIEW OF SYSTEMS:    CONSTITUTIONAL:  As per HPI.  SKIN: no rashes  HEENT:  Eyes:  No diplopia or blurred vision. ENT:  No earache, sore throat or runny nose.  CARDIOVASCULAR:  No pressure, squeezing, tightness, heaviness or aching about the chest, neck, axilla or epigastrium.  RESPIRATORY:  No cough, shortness of breath, PND or orthopnea.  GASTROINTESTINAL:  No nausea, vomiting or diarrhea.  GENITOURINARY:  No dysuria, frequency or urgency.  MUSCULOSKELETAL:  As per HPI.  SKIN:  No change in skin, hair or nails.  NEUROLOGIC:  No paresthesias, fasciculations, seizures or weakness.  PSYCHIATRIC:  No disorder of thought or mood.  ENDOCRINE:  No heat or cold intolerance, polyuria or polydipsia.  HEMATOLOGICAL:  No easy bruising or bleedings:  .

## 2021-12-06 LAB
CCP IGG SERPL-ACNC: <8 UNITS — SIGNIFICANT CHANGE UP
RF+CCP IGG SER-IMP: NEGATIVE — SIGNIFICANT CHANGE UP

## 2021-12-07 LAB
HEMOGLOBIN INTERPRETATION: SIGNIFICANT CHANGE UP
HGB A MFR BLD: 98.3 % — HIGH (ref 95.8–98)
HGB A2 MFR BLD: 1.7 % — LOW (ref 2–3.2)

## 2021-12-08 LAB
ANA PAT FLD IF-IMP: ABNORMAL
ANA TITR SER: ABNORMAL

## 2021-12-09 DIAGNOSIS — D50.0 IRON DEFICIENCY ANEMIA SECONDARY TO BLOOD LOSS (CHRONIC): ICD-10-CM

## 2021-12-09 DIAGNOSIS — N92.0 EXCESSIVE AND FREQUENT MENSTRUATION WITH REGULAR CYCLE: ICD-10-CM

## 2021-12-09 DIAGNOSIS — E03.9 HYPOTHYROIDISM, UNSPECIFIED: ICD-10-CM

## 2022-01-19 ENCOUNTER — OUTPATIENT (OUTPATIENT)
Dept: OUTPATIENT SERVICES | Facility: HOSPITAL | Age: 32
LOS: 1 days | End: 2022-01-19
Payer: SELF-PAY

## 2022-01-19 ENCOUNTER — APPOINTMENT (OUTPATIENT)
Dept: ULTRASOUND IMAGING | Facility: CLINIC | Age: 32
End: 2022-01-19
Payer: SELF-PAY

## 2022-01-19 DIAGNOSIS — Z00.8 ENCOUNTER FOR OTHER GENERAL EXAMINATION: ICD-10-CM

## 2022-01-19 DIAGNOSIS — Z98.891 HISTORY OF UTERINE SCAR FROM PREVIOUS SURGERY: Chronic | ICD-10-CM

## 2022-01-19 PROCEDURE — 76831 ECHO EXAM UTERUS: CPT | Mod: 26

## 2022-01-19 PROCEDURE — 58340 CATHETER FOR HYSTEROGRAPHY: CPT

## 2022-01-19 PROCEDURE — 76831 ECHO EXAM UTERUS: CPT

## 2022-01-26 ENCOUNTER — OUTPATIENT (OUTPATIENT)
Dept: OUTPATIENT SERVICES | Facility: HOSPITAL | Age: 32
LOS: 1 days | Discharge: HOME | End: 2022-01-26

## 2022-01-26 DIAGNOSIS — D50.9 IRON DEFICIENCY ANEMIA, UNSPECIFIED: ICD-10-CM

## 2022-01-26 DIAGNOSIS — Z92.89 PERSONAL HISTORY OF OTHER MEDICAL TREATMENT: ICD-10-CM

## 2022-01-26 DIAGNOSIS — Z98.891 HISTORY OF UTERINE SCAR FROM PREVIOUS SURGERY: Chronic | ICD-10-CM

## 2022-01-26 DIAGNOSIS — Z86.39 PERSONAL HISTORY OF OTHER ENDOCRINE, NUTRITIONAL AND METABOLIC DISEASE: ICD-10-CM

## 2022-01-26 DIAGNOSIS — R76.8 OTHER SPECIFIED ABNORMAL IMMUNOLOGICAL FINDINGS IN SERUM: ICD-10-CM

## 2022-01-27 ENCOUNTER — APPOINTMENT (OUTPATIENT)
Dept: HEMATOLOGY ONCOLOGY | Facility: CLINIC | Age: 32
End: 2022-01-27

## 2022-02-03 ENCOUNTER — OUTPATIENT (OUTPATIENT)
Dept: OUTPATIENT SERVICES | Facility: HOSPITAL | Age: 32
LOS: 1 days | Discharge: HOME | End: 2022-02-03

## 2022-02-03 DIAGNOSIS — D50.9 IRON DEFICIENCY ANEMIA, UNSPECIFIED: ICD-10-CM

## 2022-02-03 DIAGNOSIS — Z98.891 HISTORY OF UTERINE SCAR FROM PREVIOUS SURGERY: Chronic | ICD-10-CM

## 2022-02-04 ENCOUNTER — RESULT REVIEW (OUTPATIENT)
Age: 32
End: 2022-02-04

## 2022-02-04 ENCOUNTER — APPOINTMENT (OUTPATIENT)
Dept: INFUSION THERAPY | Facility: CLINIC | Age: 32
End: 2022-02-04

## 2022-02-04 ENCOUNTER — APPOINTMENT (OUTPATIENT)
Dept: HEMATOLOGY ONCOLOGY | Facility: CLINIC | Age: 32
End: 2022-02-04
Payer: COMMERCIAL

## 2022-02-04 ENCOUNTER — APPOINTMENT (OUTPATIENT)
Dept: HEMATOLOGY ONCOLOGY | Facility: CLINIC | Age: 32
End: 2022-02-04

## 2022-02-04 VITALS
TEMPERATURE: 97.7 F | DIASTOLIC BLOOD PRESSURE: 49 MMHG | HEIGHT: 63 IN | RESPIRATION RATE: 19 BRPM | BODY MASS INDEX: 21.44 KG/M2 | SYSTOLIC BLOOD PRESSURE: 110 MMHG | HEART RATE: 90 BPM | WEIGHT: 121 LBS | OXYGEN SATURATION: 100 %

## 2022-02-04 DIAGNOSIS — D72.819 DECREASED WHITE BLOOD CELL COUNT, UNSPECIFIED: ICD-10-CM

## 2022-02-04 DIAGNOSIS — D50.9 IRON DEFICIENCY ANEMIA, UNSPECIFIED: ICD-10-CM

## 2022-02-04 DIAGNOSIS — F50.89 OTHER SPECIFIED EATING DISORDER: ICD-10-CM

## 2022-02-04 LAB
ANISOCYTOSIS BLD QL: SLIGHT — SIGNIFICANT CHANGE UP
BASOPHILS # BLD AUTO: 0.01 K/UL — SIGNIFICANT CHANGE UP (ref 0–0.2)
BASOPHILS NFR BLD AUTO: 0.4 % — SIGNIFICANT CHANGE UP (ref 0–2)
DACRYOCYTES BLD QL SMEAR: SLIGHT — SIGNIFICANT CHANGE UP
ELLIPTOCYTES BLD QL SMEAR: SLIGHT — SIGNIFICANT CHANGE UP
EOSINOPHIL # BLD AUTO: 0.04 K/UL — SIGNIFICANT CHANGE UP (ref 0–0.5)
EOSINOPHIL NFR BLD AUTO: 1.5 % — SIGNIFICANT CHANGE UP (ref 0–6)
HCT VFR BLD CALC: 24.4 % — LOW (ref 34.5–45)
HGB BLD-MCNC: 6.7 G/DL — CRITICAL LOW (ref 11.5–15.5)
HYPOCHROMIA BLD QL: SLIGHT — SIGNIFICANT CHANGE UP
IMM GRANULOCYTES NFR BLD AUTO: 0 % — SIGNIFICANT CHANGE UP (ref 0–1.5)
LG PLATELETS BLD QL AUTO: SLIGHT — SIGNIFICANT CHANGE UP
LYMPHOCYTES # BLD AUTO: 1.05 K/UL — SIGNIFICANT CHANGE UP (ref 1–3.3)
LYMPHOCYTES # BLD AUTO: 39.3 % — SIGNIFICANT CHANGE UP (ref 13–44)
MACROCYTES BLD QL: SLIGHT — SIGNIFICANT CHANGE UP
MCHC RBC-ENTMCNC: 20.1 PG — LOW (ref 27–34)
MCHC RBC-ENTMCNC: 27.5 GM/DL — LOW (ref 32–36)
MCV RBC AUTO: 73.1 FL — LOW (ref 80–100)
MICROCYTES BLD QL: SLIGHT — SIGNIFICANT CHANGE UP
MONOCYTES # BLD AUTO: 0.38 K/UL — SIGNIFICANT CHANGE UP (ref 0–0.9)
MONOCYTES NFR BLD AUTO: 14.2 % — HIGH (ref 2–14)
NEUTROPHILS # BLD AUTO: 1.19 K/UL — LOW (ref 1.8–7.4)
NEUTROPHILS NFR BLD AUTO: 44.6 % — SIGNIFICANT CHANGE UP (ref 43–77)
NRBC # BLD: 0 /100 WBCS — SIGNIFICANT CHANGE UP (ref 0–0)
OVALOCYTES BLD QL SMEAR: SLIGHT — SIGNIFICANT CHANGE UP
PLAT MORPH BLD: NORMAL — SIGNIFICANT CHANGE UP
PLATELET # BLD AUTO: 214 K/UL — SIGNIFICANT CHANGE UP (ref 150–400)
POIKILOCYTOSIS BLD QL AUTO: SIGNIFICANT CHANGE UP
RBC # BLD: 3.34 M/UL — LOW (ref 3.8–5.2)
RBC # FLD: 21 % — HIGH (ref 10.3–14.5)
RBC BLD AUTO: ABNORMAL
TARGETS BLD QL SMEAR: SLIGHT — SIGNIFICANT CHANGE UP
WBC # BLD: 2.67 K/UL — LOW (ref 3.8–10.5)
WBC # FLD AUTO: 2.67 K/UL — LOW (ref 3.8–10.5)

## 2022-02-04 PROCEDURE — 99214 OFFICE O/P EST MOD 30 MIN: CPT

## 2022-02-04 RX ORDER — IRON POLYSACCHARIDE COMPLEX 15MG/0.5ML
DROPS ORAL
Refills: 0 | Status: ACTIVE | COMMUNITY

## 2022-02-07 DIAGNOSIS — F50.89 OTHER SPECIFIED EATING DISORDER: ICD-10-CM

## 2022-02-07 DIAGNOSIS — N92.0 EXCESSIVE AND FREQUENT MENSTRUATION WITH REGULAR CYCLE: ICD-10-CM

## 2022-02-07 DIAGNOSIS — D72.819 DECREASED WHITE BLOOD CELL COUNT, UNSPECIFIED: ICD-10-CM

## 2022-02-07 PROBLEM — D50.9 IRON DEFICIENCY ANEMIA: Status: ACTIVE | Noted: 2022-01-26

## 2022-02-07 NOTE — REASON FOR VISIT
[FreeTextEntry2] : Hospital f/u for severe iron deficiency anemia; also leukopenia [Interpreters_IDNumber] : [id number] [Interpreters_FullName] : Elisa [TWNoteComboBox1] : [place]

## 2022-02-07 NOTE — ASSESSMENT
[FreeTextEntry1] : Patient is a 31 y.o. with a long term, severe, iron deficiency anemia with baseline Hgb ~ 5, also leukopenia.   \par \par 1. Anemia - iron deficiency -  has been attributed to heavy periods.  \par Patient has been transfused on 4 occasions - last 12/4/21.  Hgb at discharge 8.0.\par Now Hgb 6.7.  She is symptomatic with lightheadedness, dizziness, GARZA, fatigue, and pica. \par Patient noncompliant / intolerant to PO iron. \par She is a good candidate for IV iron - recommend weekly infusions x 4, then labs 1 month after to assess response.  \par Possible side effects reviewed - aware of potential for allergic reaction - possible symptoms discussed included anaphylactic reactions.  Discussed true anaphylactic reactions are rare, <1%, but if this should occur she may need to be taken in an ambulance to the ER.  There can be staining of the skin if the IV infiltrates, joint pains, and flu-like symptoms.  She is not planning on getting a MRI soon. \par We discussed that there are different IV iron products so that if she is having issues with one product we might be able to change to a different one, but that her insurance plan ultimately needs to approve the iron product. \par \par 2. Leukopenia - Hospital labs show +OZZY -  probably etiology of leukopenia.  May need rheum eval. \par \par She will get her 1st infusion today. \par Consent form signed.  All questions answered.

## 2022-02-07 NOTE — REVIEW OF SYSTEMS
[FreeTextEntry2] : Weight loss [FreeTextEntry5] : feels has a fast heart beat [FreeTextEntry6] : with minimal exertion [FreeTextEntry7] : cramping  [de-identified] : unbalanced walking [de-identified] : worries

## 2022-02-07 NOTE — HISTORY OF PRESENT ILLNESS
[de-identified] : JACOB MCKEON is a 31 y.o. F who we are following for a severe iron deficiency anemia, recently transfused at . \par \par Patient lives in [place]. Was in [place] visiting a friend in December when she was seen at the [facility] 12/3/21 for significant GARZA, lightheadedness, and near syncopal episodes - Hgb reportedly 4.9gm/dl and she was sent to the ER.  \par 12/3/21 - 21 - Patient admitted for 2U PRBC's.  Admission was required as there was a delay in obtaining her blood due to multiple antibodies.  W/U with iron deficiency.\par Labs:  12/3/21 - WBC: 2.25  ANC: 1.04   Hgb: 5.2  Hct: 20.6  MCV: 69.4  Plts: 293   Ferritin: 5  TSAT: 11%  Serum Iron: 11  B12: 477  Folate: 15\par                            Hapto: 76     LDH: 277   Creat: 0.61   TP: 9.4  Ca: 8.8    RF: 15 (13 ULN)    OZZY: Positive    Hgb Electrophoresis: Negative for Beta thal. \par                            FOB: negative     TSH: 4.07 \par \par Patient reports long hx of iron deficiency anemia since her early 's.  Was told from heavy periods.  Periods occur monthly and last at least 7 days, occasionally 8 - 9 days.  Heavy at times - may need to change pad every 2 hours.   Patient  takes OTC iron liquid but not reliably.  Reports it give her diarrhea. \par \par She reports her baseline Hgb is ~ 4 - 5.  She has been as low as 3.  She is occasionally 9 - but this is usually after blood transfusions.  She has been transfused 3 times prior to her most recent transfusion.  The 1st was after her   - she is not sure how many units she received.  She then was transfused 2 other times, 2 Units each, last 2019.   She thinks when she was as low as 3 that she was give some IV iron, but this was years ago. \par \par Patient reports she is always tired.  She felt better after the blood transfusion, but then had her period about a week later and felt lousy again.  She has SOB with minimal exertion and stairs are hard.  She does have pica for ice.  She also reports she is loosing some of her hair. \par \par She did f/u with GYN: \par 21: Pelvic Sono - Thickened heterogeneous appearance of endometrial cavity with internal vascularity suggestive of an endometrial polyp. \par \par 22 - US Hysterography - 0.6cm left endometrial polyp\par                                               - 2.8 x 0.5cm broad based lesion of right endometrial thickening\par                                               - 0.8 x 1.0cm anterior mid endometrial submucosal echogenic lesion c/w submucosal myoma. \par \par Had biopsy - waiting for results of biopsy.  Was not started on any medication yet. \par Patient states she has never had a colonoscopy.

## 2022-02-11 ENCOUNTER — RESULT REVIEW (OUTPATIENT)
Age: 32
End: 2022-02-11

## 2022-02-11 ENCOUNTER — APPOINTMENT (OUTPATIENT)
Dept: INFUSION THERAPY | Facility: CLINIC | Age: 32
End: 2022-02-11

## 2022-02-11 VITALS
RESPIRATION RATE: 18 BRPM | HEIGHT: 63 IN | HEART RATE: 64 BPM | SYSTOLIC BLOOD PRESSURE: 112 MMHG | WEIGHT: 123 LBS | DIASTOLIC BLOOD PRESSURE: 72 MMHG | BODY MASS INDEX: 21.79 KG/M2 | OXYGEN SATURATION: 99 %

## 2022-02-11 LAB
ANISOCYTOSIS BLD QL: SIGNIFICANT CHANGE UP
BASOPHILS # BLD AUTO: 0 K/UL — SIGNIFICANT CHANGE UP (ref 0–0.2)
BASOPHILS NFR BLD AUTO: 0 % — SIGNIFICANT CHANGE UP (ref 0–2)
ELLIPTOCYTES BLD QL SMEAR: SLIGHT — SIGNIFICANT CHANGE UP
EOSINOPHIL # BLD AUTO: 0.05 K/UL — SIGNIFICANT CHANGE UP (ref 0–0.5)
EOSINOPHIL NFR BLD AUTO: 2 % — SIGNIFICANT CHANGE UP (ref 0–6)
HCT VFR BLD CALC: 29.6 % — LOW (ref 34.5–45)
HGB BLD-MCNC: 8.4 G/DL — LOW (ref 11.5–15.5)
HYPOCHROMIA BLD QL: SIGNIFICANT CHANGE UP
LYMPHOCYTES # BLD AUTO: 0.94 K/UL — LOW (ref 1–3.3)
LYMPHOCYTES # BLD AUTO: 35 % — SIGNIFICANT CHANGE UP (ref 13–44)
MACROCYTES BLD QL: SLIGHT — SIGNIFICANT CHANGE UP
MCHC RBC-ENTMCNC: 22.3 PG — LOW (ref 27–34)
MCHC RBC-ENTMCNC: 28.4 GM/DL — LOW (ref 32–36)
MCV RBC AUTO: 78.5 FL — LOW (ref 80–100)
MICROCYTES BLD QL: SIGNIFICANT CHANGE UP
MONOCYTES # BLD AUTO: 0.27 K/UL — SIGNIFICANT CHANGE UP (ref 0–0.9)
MONOCYTES NFR BLD AUTO: 10 % — SIGNIFICANT CHANGE UP (ref 2–14)
NEUTROPHILS # BLD AUTO: 1.42 K/UL — LOW (ref 1.8–7.4)
NEUTROPHILS NFR BLD AUTO: 53 % — SIGNIFICANT CHANGE UP (ref 43–77)
NRBC # BLD: 0 /100 — SIGNIFICANT CHANGE UP (ref 0–0)
NRBC # BLD: SIGNIFICANT CHANGE UP /100 WBCS (ref 0–0)
PLAT MORPH BLD: NORMAL — SIGNIFICANT CHANGE UP
PLATELET # BLD AUTO: 274 K/UL — SIGNIFICANT CHANGE UP (ref 150–400)
POIKILOCYTOSIS BLD QL AUTO: SLIGHT — SIGNIFICANT CHANGE UP
POLYCHROMASIA BLD QL SMEAR: SLIGHT — SIGNIFICANT CHANGE UP
RBC # BLD: 3.77 M/UL — LOW (ref 3.8–5.2)
RBC # FLD: 24.8 % — HIGH (ref 10.3–14.5)
RBC BLD AUTO: ABNORMAL
WBC # BLD: 2.68 K/UL — LOW (ref 3.8–10.5)
WBC # FLD AUTO: 2.68 K/UL — LOW (ref 3.8–10.5)

## 2022-02-14 LAB
VWF AG ACT/NOR PPP IA: 105 % — SIGNIFICANT CHANGE UP (ref 63–170)
VWF:RCO ACT/NOR PPP PL AGG: 97 % — SIGNIFICANT CHANGE UP (ref 45–133)

## 2022-02-18 ENCOUNTER — APPOINTMENT (OUTPATIENT)
Dept: INFUSION THERAPY | Facility: CLINIC | Age: 32
End: 2022-02-18

## 2022-02-18 VITALS
WEIGHT: 124 LBS | OXYGEN SATURATION: 98 % | RESPIRATION RATE: 17 BRPM | DIASTOLIC BLOOD PRESSURE: 67 MMHG | HEART RATE: 86 BPM | SYSTOLIC BLOOD PRESSURE: 105 MMHG | TEMPERATURE: 97.9 F | BODY MASS INDEX: 21.97 KG/M2

## 2022-02-25 ENCOUNTER — APPOINTMENT (OUTPATIENT)
Dept: INFUSION THERAPY | Facility: CLINIC | Age: 32
End: 2022-02-25

## 2022-02-25 VITALS
OXYGEN SATURATION: 100 % | RESPIRATION RATE: 17 BRPM | HEART RATE: 84 BPM | DIASTOLIC BLOOD PRESSURE: 78 MMHG | SYSTOLIC BLOOD PRESSURE: 114 MMHG | WEIGHT: 124.25 LBS | TEMPERATURE: 98 F | BODY MASS INDEX: 22.01 KG/M2

## 2022-03-10 ENCOUNTER — APPOINTMENT (OUTPATIENT)
Dept: OBGYN | Facility: CLINIC | Age: 32
End: 2022-03-10
Payer: COMMERCIAL

## 2022-03-10 ENCOUNTER — MED ADMIN CHARGE (OUTPATIENT)
Age: 32
End: 2022-03-10

## 2022-03-10 ENCOUNTER — NON-APPOINTMENT (OUTPATIENT)
Age: 32
End: 2022-03-10

## 2022-03-10 ENCOUNTER — OUTPATIENT (OUTPATIENT)
Dept: OUTPATIENT SERVICES | Facility: HOSPITAL | Age: 32
LOS: 1 days | End: 2022-03-10
Payer: SELF-PAY

## 2022-03-10 VITALS — WEIGHT: 125 LBS | BODY MASS INDEX: 22.14 KG/M2 | DIASTOLIC BLOOD PRESSURE: 70 MMHG | SYSTOLIC BLOOD PRESSURE: 120 MMHG

## 2022-03-10 DIAGNOSIS — Z82.49 FAMILY HISTORY OF ISCHEMIC HEART DISEASE AND OTHER DISEASES OF THE CIRCULATORY SYSTEM: ICD-10-CM

## 2022-03-10 DIAGNOSIS — Z98.891 HISTORY OF UTERINE SCAR FROM PREVIOUS SURGERY: Chronic | ICD-10-CM

## 2022-03-10 DIAGNOSIS — N76.0 ACUTE VAGINITIS: ICD-10-CM

## 2022-03-10 DIAGNOSIS — Z83.3 FAMILY HISTORY OF DIABETES MELLITUS: ICD-10-CM

## 2022-03-10 DIAGNOSIS — N84.0 POLYP OF CORPUS UTERI: ICD-10-CM

## 2022-03-10 PROCEDURE — ZZZZZ: CPT

## 2022-03-10 PROCEDURE — 99213 OFFICE O/P EST LOW 20 MIN: CPT | Mod: 25

## 2022-03-10 PROCEDURE — G0463: CPT

## 2022-03-10 PROCEDURE — 90471 IMMUNIZATION ADMIN: CPT

## 2022-03-10 PROCEDURE — 90471 IMMUNIZATION ADMIN: CPT | Mod: NC

## 2022-03-10 RX ORDER — MEDROXYPROGESTERONE ACETATE 150 MG/ML
150 INJECTION, SUSPENSION INTRAMUSCULAR
Qty: 0 | Refills: 0 | Status: COMPLETED | OUTPATIENT
Start: 2022-03-10

## 2022-03-11 PROBLEM — Z82.49 FAMILY HISTORY OF HYPERTENSION: Status: ACTIVE | Noted: 2022-03-11

## 2022-03-11 PROBLEM — Z83.3 FAMILY HISTORY OF DIABETES MELLITUS: Status: ACTIVE | Noted: 2022-03-11

## 2022-03-11 PROBLEM — N84.0 ENDOMETRIAL POLYP: Status: ACTIVE | Noted: 2022-03-11

## 2022-03-11 PROBLEM — Z82.49 FAMILY HISTORY OF CORONARY ARTERIOSCLEROSIS: Status: ACTIVE | Noted: 2022-03-11

## 2022-03-11 NOTE — DISCUSSION/SUMMARY
[FreeTextEntry1] : 32yo Solomon Islander speaking P1 LMP (3/6/2022) with longstanding hx of HMB requiring multiple blood transfusions, found to have multiple endometrial polyps on SIS presents for surgical consultation. Discussed findings at length with the patient and her cousin (at pt's request). We discussed natural course of EM polyps and methods to prevent future HMB. We also discussed options for management of bleeding as a bridge to surgery - CHCs, POP, TXA, LNG-IUD, Nexplanon, DepoProvera. Patient adamantly declines oral pills as she cannot swallow them. She prefers Depo-Provera today. All questions answered. - Will schedule hysteroscopic polypectomy with myosure tissue extraction system, dilatation and curettage, placement of LNG-IUD\par - patient to obtain records of endometrial biopsy and pap \par - task sent to schedule patient for surgery \par \par Viviana, PGY6

## 2022-03-11 NOTE — PHYSICAL EXAM
[No Acute Distress] : no acute distress [Soft] : soft [Non-distended] : non-distended [Non-tender] : non-tender [No Mass] : no mass [Examination Of The Breasts] : a normal appearance [No Masses] : no breast masses were palpable [Labia Majora] : normal [Labia Minora] : normal [Normal] : normal [Anteversion] : anteverted [Uterine Adnexae] : normal [FreeTextEntry7] : well healed pfannenstile skin incision [FreeTextEntry6] : 8cm AV uterus, mobile without adnexal masses

## 2022-03-11 NOTE — HISTORY OF PRESENT ILLNESS
[FreeTextEntry1] :  # [id number] \par \par 30yo  LMP 3/6/2022 with hx of SAVANAH and HMB s/p multiple blood transfusions presents for consultation. +HMB without intermenstrual bleeding.  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 vWF testing performed. \par Today, denies symptoms of anemia. Never been on medication to manage her HMB. \par Of note, pt reports workup with cervical cancer screen and endometrial sampling at [facility]. No records identified in the chart. She was recommended Depo-Provera to manage her bleeding in anticipation for surgery, however declines\par Would like to avoid pills as well due to trouble with ingestion \par \par Histories: \par OBHx: \par - () primary  full term girl c/b anemia which pt reports was the reason for her scheduled c/s\par GYNHx: \par - Menarche: 14, [id number], 8-9days of bleeding with CD#1-6 changing 7-8pads for the entire day \par PMH:\par - Hyperthyroidism, no current meds (denies treatment, dx [place]) \par PSxH:\par -  x1 \par Meds: none\par Allergies: none\par Social Hx: denies toxic habits \par FHx: denies hx of endometrial, cervix, ovarian, breast cancer. Father- CAD, HTN, DM\par \par \par Imaging: \par (2022) SIS: FINDINGS:\par \par Uterus: 8.5 cm in length\par Endometrium the endometrium distended well. There are 3 focal findings \par within the endometrium. Normal left aspect of the endometrium there is a focal mid endometrial \par polyp measuring 0.6 x 0.6 x 0.4 cm in size Along the right aspect of the endometrium is a plaque-like area of \par endometrial thickening measuring 0.5 cm in with and 2.8 cm in length. Along the anterior mid endometrium. There is a rounded echogenic 0.9 x 1.0 cm subcutaneous endometrial/submucosal lesion which does not have \par intraluminal component and appears to correspond to a submucosal myoma slightly bulging the endometrium\par .\par \par Free Fluid: None.\par \par IMPRESSION:\par \par Several findings involving the endometrium which included a 0.6 cm left endometrial polyp, a broad-based lesion of right endometrial thickening measuring 2.8 x 0.5 cm in length as well as a 0.8 x 1.0 cm anterior mid endometrium submucosal echogenic lesion without intraluminal component just being a submucosal myoma\par \par () TVUS: Uterus: 8.4 cm x 4.8 cm x 5.4 cm.\par Endometrium: 1.6 cm. Thickened heterogeneous appearance of the endometrium with internal vascularity. An echogenic lesion is present, measuring 0.6 cm.\par \par Right ovary: 2.6 cm x 2.2 cm x 2.0 cm. Cyst, measuring 1.7 x 1.7 x 1.1 cm. Normal arterial and venous waveforms.\par Left ovary: 1.5 cm x 1.4 cm x 1.3 cm. Within normal limits. Normal arterial and venous waveforms.\par \par Fluid: None.\par \par IMPRESSION:\par Thickened heterogeneous appearance of the endometrial cavity with internal vascularity and suggestion of endometrial polyp. Correlation with tissue sampling is suggested.\par \par

## 2022-03-21 DIAGNOSIS — N92.0 EXCESSIVE AND FREQUENT MENSTRUATION WITH REGULAR CYCLE: ICD-10-CM

## 2022-03-21 LAB — VWF CBA/VWF AG PPP IA-RTO: SIGNIFICANT CHANGE UP

## 2022-03-24 ENCOUNTER — OUTPATIENT (OUTPATIENT)
Dept: OUTPATIENT SERVICES | Facility: HOSPITAL | Age: 32
LOS: 1 days | Discharge: HOME | End: 2022-03-24

## 2022-03-24 DIAGNOSIS — Z98.891 HISTORY OF UTERINE SCAR FROM PREVIOUS SURGERY: Chronic | ICD-10-CM

## 2022-03-24 DIAGNOSIS — D50.9 IRON DEFICIENCY ANEMIA, UNSPECIFIED: ICD-10-CM

## 2022-03-25 ENCOUNTER — APPOINTMENT (OUTPATIENT)
Dept: HEMATOLOGY ONCOLOGY | Facility: CLINIC | Age: 32
End: 2022-03-25

## 2022-03-28 ENCOUNTER — OUTPATIENT (OUTPATIENT)
Dept: OUTPATIENT SERVICES | Facility: HOSPITAL | Age: 32
LOS: 1 days | End: 2022-03-28
Payer: SELF-PAY

## 2022-03-28 VITALS
HEART RATE: 78 BPM | DIASTOLIC BLOOD PRESSURE: 83 MMHG | TEMPERATURE: 98 F | RESPIRATION RATE: 16 BRPM | OXYGEN SATURATION: 99 % | HEIGHT: 63 IN | SYSTOLIC BLOOD PRESSURE: 120 MMHG | WEIGHT: 121.92 LBS

## 2022-03-28 DIAGNOSIS — N84.0 POLYP OF CORPUS UTERI: ICD-10-CM

## 2022-03-28 DIAGNOSIS — Z98.891 HISTORY OF UTERINE SCAR FROM PREVIOUS SURGERY: Chronic | ICD-10-CM

## 2022-03-28 DIAGNOSIS — Z01.818 ENCOUNTER FOR OTHER PREPROCEDURAL EXAMINATION: ICD-10-CM

## 2022-03-28 LAB
BLD GP AB SCN SERPL QL: NEGATIVE — SIGNIFICANT CHANGE UP
HCT VFR BLD CALC: 37.9 % — SIGNIFICANT CHANGE UP (ref 34.5–45)
HGB BLD-MCNC: 12 G/DL — SIGNIFICANT CHANGE UP (ref 11.5–15.5)
MCHC RBC-ENTMCNC: 27.4 PG — SIGNIFICANT CHANGE UP (ref 27–34)
MCHC RBC-ENTMCNC: 31.7 GM/DL — LOW (ref 32–36)
MCV RBC AUTO: 86.5 FL — SIGNIFICANT CHANGE UP (ref 80–100)
NRBC # BLD: 0 /100 WBCS — SIGNIFICANT CHANGE UP (ref 0–0)
PLATELET # BLD AUTO: 172 K/UL — SIGNIFICANT CHANGE UP (ref 150–400)
RBC # BLD: 4.38 M/UL — SIGNIFICANT CHANGE UP (ref 3.8–5.2)
RBC # FLD: 24.7 % — HIGH (ref 10.3–14.5)
RH IG SCN BLD-IMP: POSITIVE — SIGNIFICANT CHANGE UP
WBC # BLD: 2 K/UL — LOW (ref 3.8–10.5)
WBC # FLD AUTO: 2 K/UL — LOW (ref 3.8–10.5)

## 2022-03-28 PROCEDURE — 86900 BLOOD TYPING SEROLOGIC ABO: CPT

## 2022-03-28 PROCEDURE — 86850 RBC ANTIBODY SCREEN: CPT

## 2022-03-28 PROCEDURE — 86901 BLOOD TYPING SEROLOGIC RH(D): CPT

## 2022-03-28 PROCEDURE — 85027 COMPLETE CBC AUTOMATED: CPT

## 2022-03-28 PROCEDURE — G0463: CPT

## 2022-03-28 RX ORDER — SODIUM CHLORIDE 9 MG/ML
3 INJECTION INTRAMUSCULAR; INTRAVENOUS; SUBCUTANEOUS EVERY 8 HOURS
Refills: 0 | Status: DISCONTINUED | OUTPATIENT
Start: 2022-04-13 | End: 2022-04-27

## 2022-03-28 RX ORDER — SODIUM CHLORIDE 9 MG/ML
1000 INJECTION, SOLUTION INTRAVENOUS
Refills: 0 | Status: DISCONTINUED | OUTPATIENT
Start: 2022-04-13 | End: 2022-04-27

## 2022-03-28 NOTE — H&P PST ADULT - NSICDXPASTMEDICALHX_GEN_ALL_CORE_FT
PAST MEDICAL HISTORY:  2019 novel coronavirus disease (COVID-19) Covid + 21 with symptoms of headache, body aches, sore throat and cough x few days - No SOB/No Hospitalizations    Anemia iron-deficiency anemia    History of blood transfusion x 3 (During , 2019 for anemia and 21 for anemia)    Hypothyroidism not on meds    Polyp of corpus uteri      PAST MEDICAL HISTORY:  2019 novel coronavirus disease (COVID-19) Covid + 21 with symptoms of headache, body aches, sore throat and cough x few days - No SOB/No Hospitalizations    Anemia iron-deficiency anemia    History of blood transfusion x 3 (During , 2019 for anemia x 2 and 21 for anemia x2)    Hypothyroidism not on meds; Dx in [facility] [place]    Polyp of corpus uteri      PAST MEDICAL HISTORY:  2019 novel coronavirus disease (COVID-19) Covid + 21 with symptoms of headache, body aches, sore throat and cough x few days - No SOB/No Hospitalizations    Anemia iron-deficiency anemia    History of blood transfusion x 3 (During , 2019 for anemia x 2 and 21 for anemia x2)    Hypothyroidism not on meds; Dx in [facility] [place]    Polyp of corpus uteri     Positive OZZY (antinuclear antibody)

## 2022-03-28 NOTE — H&P PST ADULT - OTHER CARE PROVIDERS
Dr. Apoorva Schwarz (Hematologist) [phone]; Last visit 2/4/21 in chart Dr. Apoorva Schwarz (Hematologist) [phone]; Last visit 2/4/22 in chart

## 2022-03-28 NOTE — H&P PST ADULT - FALL HARM RISK - UNIVERSAL INTERVENTIONS
Bed in lowest position, wheels locked, appropriate side rails in place/Call bell, personal items and telephone in reach/Instruct patient to call for assistance before getting out of bed or chair/Non-slip footwear when patient is out of bed/[place] to call system/Physically safe environment - no spills, clutter or unnecessary equipment/Purposeful Proactive Rounding/Room/bathroom lighting operational, light cord in reach

## 2022-03-28 NOTE — H&P PST ADULT - ATTENDING COMMENTS
Pt with hx of abnormal uterine bleeding and endo polyp.  Pt for D and C/Polypectomy/PAP smear and IUD Placement.    Pt understands risks/benefits of surgery including but not limited to bleeding, infection, injury to bowel/bladder, nerve damage and even death.  t has verbalized understanding of the above and has signed informed consent.    JHONATAN Barrow

## 2022-03-28 NOTE — H&P PST ADULT - NSANTHOSAYNRD_GEN_A_CORE
No. AMARIS screening performed.  STOP BANG Legend: 0-2 = LOW Risk; 3-4 = INTERMEDIATE Risk; 5-8 = HIGH Risk

## 2022-03-28 NOTE — H&P PST ADULT - PROBLEM SELECTOR PLAN 1
Pt is scheduled for a D&C, Operative Hysteroscopy and Polypectomy with Myosure on 4/13/22 [facility]  Covid PCR test scheduled for 4/10/22 at [facility]  CBC and T/S ordered and obtained at [facility]  ABO, Urine HCG on admit

## 2022-03-28 NOTE — H&P PST ADULT - NEGATIVE GENERAL GENITOURINARY SYMPTOMS
no hematuria/no renal colic/no flank pain L/no flank pain R/no incontinence/no dysuria/no urinary hesitancy/normal urinary frequency

## 2022-03-28 NOTE — H&P PST ADULT - HISTORY OF PRESENT ILLNESS
31 year old Swedish-speaking F with a PMH iron deficiency anemia and hypothyroidism (not on meds) recently presented to ED [facility] (12/3/21-12/5/21) with c/o SOB and dizziness. She reports having dyspnea, particularly on exertion, but also at rest, for about 2 years now, along with lightheadedness and fatigue. 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 She is from [place] and came to [place] to visit a friend, but she was seen at the [facility] today and was noted to have a Hb of 4.9, and was sent to the ED. Other symptoms she endorses are frontal pulsating headaches that make her sometimes sensitive to light, which can be a couple days in a row, but she can also go weeks without. She was diagnosed with iron deficiency anemia about 18 years ago. She was told it was due to heavy periods. She has intermittently taken iron pills and folic acid, but not every day. She states that she had always had periods every month, but occasionally heavy periods (sometimes 8-9 days). She received 1 transfusion of PRBCs during her ED admission.     Covid PCR test scheduled for 4/10/22 at [facility]  Covid vaccine Pfizer 2nd dose received 7/9/2021; Did not receive the Booster  Denies recent travel, exposure or Covid symptoms   Covid + 12/31/21 with symptoms of headache, body aches, sore throat and cough x few days - No SOB/No Hospitalizations  31 year old Czech-speaking F with a PMH iron deficiency anemia, positive OZZY antibody and hypothyroidism (not on meds) recently presented to the [facility] with c/o significant GARZA, lightheadedness and near syncopal episodes. Hgb reportedly 4.9gm/dl and she was sent to [facility] ED on 12/3/21-21 s/p 2 units PRBCs received during her admission. Her Hgb was 8.0gm/dl at discharge. Pt states that she felt better after the blood transfusions and no longer had GARZA or dizziness. Pt was diagnosed with iron deficiency anemia 18 years ago and was told it was because of heavy menstrual cycles. Pt reports that her menstrual cycle occurs monthly and lasts about 7 days, occasionally 8-9 days and is heavy at times where she may need to change her sanitary pad Q 2 hours. Pt takes OTC iron liquid but not reliably because it gives her diarrhea. She reports her baseline Hgb is around 4-5gm/dl. She has been transfused with PRBCs 2 other times during her  in  and in July 2019 x 2. She f/u with her hematologist on 22 with c/o lightheadedness, dizziness, GARZA and fatigue. Her hgb was 6.7gm/dl at that time. She was recommended to receive IV weekly iron transfusions x 4. She followed up with her GYN outpatient s/p Pelvic sonogram suggestive of an endometrial polyp. Pt now presents to PST for scheduled D&C, Diagnostic Hysteroscopy and [facility]pect[facility].    Covid PCR test scheduled for 4/10/22 at [facility] beds at the [facility]  Covid vaccine Pfizer 2nd dose received 2021; Did not receive the Booster  Denies recent travel, exposure or Covid symptoms   Covid + 21 with symptoms of headache, body aches, sore throat and cough x few days - No SOB/No Hospitalizations

## 2022-03-31 ENCOUNTER — LABORATORY RESULT (OUTPATIENT)
Age: 32
End: 2022-03-31

## 2022-03-31 ENCOUNTER — APPOINTMENT (OUTPATIENT)
Dept: OBGYN | Facility: CLINIC | Age: 32
End: 2022-03-31
Payer: COMMERCIAL

## 2022-03-31 ENCOUNTER — OUTPATIENT (OUTPATIENT)
Dept: OUTPATIENT SERVICES | Facility: HOSPITAL | Age: 32
LOS: 1 days | End: 2022-03-31
Payer: SELF-PAY

## 2022-03-31 VITALS — SYSTOLIC BLOOD PRESSURE: 110 MMHG | DIASTOLIC BLOOD PRESSURE: 62 MMHG | WEIGHT: 125.2 LBS | BODY MASS INDEX: 22.18 KG/M2

## 2022-03-31 DIAGNOSIS — N76.0 ACUTE VAGINITIS: ICD-10-CM

## 2022-03-31 DIAGNOSIS — N92.0 EXCESSIVE AND FREQUENT MENSTRUATION WITH REGULAR CYCLE: ICD-10-CM

## 2022-03-31 DIAGNOSIS — Z98.891 HISTORY OF UTERINE SCAR FROM PREVIOUS SURGERY: Chronic | ICD-10-CM

## 2022-03-31 PROBLEM — U07.1 COVID-19: Chronic | Status: ACTIVE | Noted: 2022-03-28

## 2022-03-31 PROBLEM — Z92.89 PERSONAL HISTORY OF OTHER MEDICAL TREATMENT: Chronic | Status: ACTIVE | Noted: 2021-12-14

## 2022-03-31 PROBLEM — D64.9 ANEMIA, UNSPECIFIED: Chronic | Status: ACTIVE | Noted: 2021-12-03

## 2022-03-31 PROBLEM — E03.9 HYPOTHYROIDISM, UNSPECIFIED: Chronic | Status: ACTIVE | Noted: 2021-12-03

## 2022-03-31 PROBLEM — R76.8 OTHER SPECIFIED ABNORMAL IMMUNOLOGICAL FINDINGS IN SERUM: Chronic | Status: ACTIVE | Noted: 2022-03-28

## 2022-03-31 PROBLEM — N84.0 POLYP OF CORPUS UTERI: Chronic | Status: ACTIVE | Noted: 2022-03-28

## 2022-03-31 PROCEDURE — 99213 OFFICE O/P EST LOW 20 MIN: CPT | Mod: GE,25

## 2022-03-31 PROCEDURE — 58100 BIOPSY OF UTERUS LINING: CPT | Mod: NC

## 2022-03-31 PROCEDURE — 81003 URINALYSIS AUTO W/O SCOPE: CPT

## 2022-03-31 PROCEDURE — 58100 BIOPSY OF UTERUS LINING: CPT

## 2022-03-31 PROCEDURE — 88175 CYTOPATH C/V AUTO FLUID REDO: CPT

## 2022-03-31 PROCEDURE — 87624 HPV HI-RISK TYP POOLED RSLT: CPT

## 2022-03-31 PROCEDURE — G0463: CPT

## 2022-03-31 PROCEDURE — 88141 CYTOPATH C/V INTERPRET: CPT

## 2022-04-01 LAB — HPV HIGH+LOW RISK DNA PNL CVX: SIGNIFICANT CHANGE UP

## 2022-04-01 NOTE — HISTORY OF PRESENT ILLNESS
[FreeTextEntry1] : \par \par 32 yo  LMP 3/6 presenting for pap smear and EMB in preparation for scheduled hysteroscopic polypectomy with myosure, dilatation and curettage, and placement of IUD (currently scheduled for ). Patient unable to obtain records for prior pap. Denies any complaints today. No abnormal/heavy bleeding since menses. Denies lightheadedness, dizziness, SOB, CP.

## 2022-04-01 NOTE — DISCUSSION/SUMMARY
[FreeTextEntry1] : MIGS FELLOW ADDENDUM\par I have read the above note, and agree with the resident's assessment and plan with the following additions/exceptions: \par \par 32yo with hx of longstanding HMB requiring transfusion in the past and SIS demonstrating EM polyp presents for cervical cancer screen + endometrial sampling. \par Scheduled for surgery on 4/13 with Dr. Barrow\par Exam and procedure performed by Dr. Guthrie and Dr. Zelaya\par Proceed with surgery

## 2022-04-01 NOTE — PROCEDURE
[Cervical Pap Smear] : cervical Pap smear [Liquid Base] : liquid base [Endometrial Biopsy] : Endometrial biopsy [Time out performed] : Pre-procedure time out performed.  Patient's name, date of birth and procedure confirmed. [Consent Obtained] : Consent obtained [Pre-op Evaluation] : Pre-op evaluation [Irregular Bleeding] : irregular uterine bleeding [Risks] : risks [Benefits] : benefits [Alternatives] : alternatives [Patient] : patient [Infection] : infection [Bleeding] : bleeding [Allergic Reaction] : allergic reaction [Uterine Perforation] : uterine perforation [Negative] : negative pregnancy test [Pain] : pain [No Premedication] : No premedication [Paracervical Block] : paracervical block was performed [___ mL Injected] : [unfilled] ~UmL of lidocaine [Tenaculum] : Tenaculum [Sounded to ___ cm] : sounded to [unfilled] ~Ucm [Retroverted] : retroverted [Scant] : scant [Sent to Pathology] : placed in buffered formalin and sent for pathology [Tolerated Well] : Patient tolerated the procedure well [No Complications] : No complications [LMPDate] : 03/06/2022 [de-identified] : ultrasound guided [de-identified] : monsels

## 2022-04-01 NOTE — PHYSICAL EXAM
[Chaperone Present] : A chaperone was present in the examining room during all aspects of the physical examination [Appropriately responsive] : appropriately responsive [Alert] : alert [No Acute Distress] : no acute distress [No Lymphadenopathy] : no lymphadenopathy [Regular Rate Rhythm] : regular rate rhythm [No Murmurs] : no murmurs [Clear to Auscultation B/L] : clear to auscultation bilaterally [Soft] : soft [Non-tender] : non-tender [Non-distended] : non-distended [No HSM] : No HSM [No Lesions] : no lesions [No Mass] : no mass [Oriented x3] : oriented x3 [Labia Majora] : normal [Labia Minora] : normal [Normal] : normal [FreeTextEntry1] : Dr. Zelaya in assistance

## 2022-04-06 ENCOUNTER — NON-APPOINTMENT (OUTPATIENT)
Age: 32
End: 2022-04-06

## 2022-04-06 LAB — CYTOLOGY SPEC DOC CYTO: SIGNIFICANT CHANGE UP

## 2022-04-10 ENCOUNTER — OUTPATIENT (OUTPATIENT)
Dept: OUTPATIENT SERVICES | Facility: HOSPITAL | Age: 32
LOS: 1 days | End: 2022-04-10
Payer: SELF-PAY

## 2022-04-10 DIAGNOSIS — Z98.891 HISTORY OF UTERINE SCAR FROM PREVIOUS SURGERY: Chronic | ICD-10-CM

## 2022-04-10 DIAGNOSIS — Z11.52 ENCOUNTER FOR SCREENING FOR COVID-19: ICD-10-CM

## 2022-04-10 LAB — SARS-COV-2 RNA SPEC QL NAA+PROBE: SIGNIFICANT CHANGE UP

## 2022-04-10 PROCEDURE — C9803: CPT

## 2022-04-10 PROCEDURE — U0005: CPT

## 2022-04-10 PROCEDURE — U0003: CPT

## 2022-04-11 DIAGNOSIS — N92.0 EXCESSIVE AND FREQUENT MENSTRUATION WITH REGULAR CYCLE: ICD-10-CM

## 2022-04-12 ENCOUNTER — TRANSCRIPTION ENCOUNTER (OUTPATIENT)
Age: 32
End: 2022-04-12

## 2022-04-13 ENCOUNTER — RESULT REVIEW (OUTPATIENT)
Age: 32
End: 2022-04-13

## 2022-04-13 ENCOUNTER — TRANSCRIPTION ENCOUNTER (OUTPATIENT)
Age: 32
End: 2022-04-13

## 2022-04-13 ENCOUNTER — APPOINTMENT (OUTPATIENT)
Dept: OBGYN | Facility: HOSPITAL | Age: 32
End: 2022-04-13

## 2022-04-13 ENCOUNTER — OUTPATIENT (OUTPATIENT)
Dept: OUTPATIENT SERVICES | Facility: HOSPITAL | Age: 32
LOS: 1 days | End: 2022-04-13
Payer: SELF-PAY

## 2022-04-13 VITALS
SYSTOLIC BLOOD PRESSURE: 108 MMHG | HEART RATE: 73 BPM | OXYGEN SATURATION: 100 % | RESPIRATION RATE: 13 BRPM | TEMPERATURE: 98 F | DIASTOLIC BLOOD PRESSURE: 72 MMHG

## 2022-04-13 VITALS
TEMPERATURE: 98 F | RESPIRATION RATE: 18 BRPM | HEART RATE: 77 BPM | OXYGEN SATURATION: 98 % | DIASTOLIC BLOOD PRESSURE: 71 MMHG | HEIGHT: 63 IN | WEIGHT: 121.92 LBS | SYSTOLIC BLOOD PRESSURE: 102 MMHG

## 2022-04-13 DIAGNOSIS — N84.0 POLYP OF CORPUS UTERI: ICD-10-CM

## 2022-04-13 DIAGNOSIS — Z98.891 HISTORY OF UTERINE SCAR FROM PREVIOUS SURGERY: Chronic | ICD-10-CM

## 2022-04-13 DIAGNOSIS — Z01.818 ENCOUNTER FOR OTHER PREPROCEDURAL EXAMINATION: ICD-10-CM

## 2022-04-13 LAB — RH IG SCN BLD-IMP: POSITIVE — SIGNIFICANT CHANGE UP

## 2022-04-13 PROCEDURE — 88175 CYTOPATH C/V AUTO FLUID REDO: CPT

## 2022-04-13 PROCEDURE — 87624 HPV HI-RISK TYP POOLED RSLT: CPT

## 2022-04-13 PROCEDURE — 58300 INSERT INTRAUTERINE DEVICE: CPT

## 2022-04-13 PROCEDURE — 88141 CYTOPATH C/V INTERPRET: CPT

## 2022-04-13 PROCEDURE — 88305 TISSUE EXAM BY PATHOLOGIST: CPT

## 2022-04-13 PROCEDURE — 58558 HYSTEROSCOPY BIOPSY: CPT

## 2022-04-13 PROCEDURE — 88305 TISSUE EXAM BY PATHOLOGIST: CPT | Mod: 26

## 2022-04-13 DEVICE — MYOSURE TISSUE REMOVAL FMS FOR FLUENT XL: Type: IMPLANTABLE DEVICE | Status: FUNCTIONAL

## 2022-04-13 DEVICE — IUD MIRENA: Type: IMPLANTABLE DEVICE | Status: FUNCTIONAL

## 2022-04-13 DEVICE — MYOSURE TISSUE REMOVAL DEVICE LITE: Type: IMPLANTABLE DEVICE | Status: FUNCTIONAL

## 2022-04-13 DEVICE — MYOSURE TISSUE REMOVAL DEVICE REACH: Type: IMPLANTABLE DEVICE | Status: FUNCTIONAL

## 2022-04-13 RX ORDER — IBUPROFEN 200 MG
1 TABLET ORAL
Qty: 0 | Refills: 0 | DISCHARGE

## 2022-04-13 RX ORDER — LIDOCAINE HCL 20 MG/ML
0.2 VIAL (ML) INJECTION ONCE
Refills: 0 | Status: COMPLETED | OUTPATIENT
Start: 2022-04-13 | End: 2022-04-13

## 2022-04-13 RX ORDER — ACETAMINOPHEN 500 MG
2 TABLET ORAL
Qty: 0 | Refills: 0 | DISCHARGE

## 2022-04-13 RX ADMIN — SODIUM CHLORIDE 100 MILLILITER(S): 9 INJECTION, SOLUTION INTRAVENOUS at 11:49

## 2022-04-13 NOTE — ASU PATIENT PROFILE, ADULT - FALL HARM RISK - UNIVERSAL INTERVENTIONS
Bed in lowest position, wheels locked, appropriate side rails in place/Call bell, personal items and telephone in reach/Instruct patient to call for assistance before getting out of bed or chair/Non-slip footwear when patient is out of bed/[place] to call system/Physically safe environment - no spills, clutter or unnecessary equipment/Purposeful Proactive Rounding/Room/bathroom lighting operational, light cord in reach

## 2022-04-13 NOTE — BRIEF OPERATIVE NOTE - NSICDXBRIEFPROCEDURE_GEN_ALL_CORE_FT
PROCEDURES:  Hysteroscopy with dilation and curettage of uterus 13-Apr-2022 15:01:15  Love Norman  Insertion of Mirena intrauterine device (IUD) 13-Apr-2022 15:01:28  Love Norman  Hysteroscopy with dilation and curettage of uterus using MyoSure device 13-Apr-2022 15:01:36  Love Norman

## 2022-04-13 NOTE — BRIEF OPERATIVE NOTE - COMMENTS
Mirena IUD Lot # [id number]  Fluid Deficit: 100 Mirena IUD Lot # [id number]  Fluid Deficit: 100  Dictation: [id number]

## 2022-04-13 NOTE — ASU DISCHARGE PLAN (ADULT/PEDIATRIC) - ASU DC SPECIAL INSTRUCTIONSFT
Expect abdominal cramping/pain and spotting for the next weeks. Take ibuprofen and Tylenol for cramping. Use a pad as needed. Call your physician or go to the emergency room if you experience any of the following: heavy vaginal bleeding (soaking more than 2 pads in 1 hour for 2 hours), fever, chills, nausea, vomiting, or pain that is not controlled by medication. Follow-up with [facility] at [address] [place] [address] in 2 weeks.

## 2022-04-13 NOTE — BRIEF OPERATIVE NOTE - OPERATION/FINDINGS
Exam under anesthesia revealed anteverted uterus 8 weeks in size, adnexa nonpalpable bilaterally. Pap smear obtained. Anterior right sided one cm calcified endometrial bump possible fibroid vs polyp, and left sided sub cm lesion. Mirena IUD inserted Lot # [id number]

## 2022-04-13 NOTE — ASU PATIENT PROFILE, ADULT - NSICDXPASTMEDICALHX_GEN_ALL_CORE_FT
PAST MEDICAL HISTORY:  2019 novel coronavirus disease (COVID-19) Covid + 21 with symptoms of headache, body aches, sore throat and cough x few days - No SOB/No Hospitalizations    Anemia iron-deficiency anemia    History of blood transfusion x 3 (During , 2019 for anemia x 2 and 21 for anemia x2)    Hypothyroidism not on meds; Dx in [facility] [place]    Polyp of corpus uteri     Positive OZZY (antinuclear antibody)

## 2022-04-13 NOTE — ASU DISCHARGE PLAN (ADULT/PEDIATRIC) - NS MD DC FALL RISK RISK
For information on Fall & Injury Prevention, visit: [email or web address] OR  [email or web address] OR  https://www.cdc.gov/steadi/patient.html

## 2022-04-13 NOTE — ASU DISCHARGE PLAN (ADULT/PEDIATRIC) - CALL YOUR DOCTOR IF YOU HAVE ANY OF THE FOLLOWING:
Bleeding that does not stop/Pain not relieved by Medications/Fever greater than (need to indicate Fahrenheit or Celsius)/Nausea and vomiting that does not stop/Inability to tolerate liquids or foods Bleeding that does not stop/Pain not relieved by Medications/Fever greater than (need to indicate Fahrenheit or Celsius)/Wound/Surgical Site with redness, or foul smelling discharge or pus/Nausea and vomiting that does not stop/Unable to urinate/Inability to tolerate liquids or foods

## 2022-04-13 NOTE — ASU DISCHARGE PLAN (ADULT/PEDIATRIC) - PROVIDER TOKENS
FREE:[LAST:[[facility]],PHONE:[phone]],FAX:[(   )    -],ADDRESS:[[address] [place] [address]]]

## 2022-04-13 NOTE — ASU DISCHARGE PLAN (ADULT/PEDIATRIC) - NURSING INSTRUCTIONS
Next dose of Tylenol will be on or after ______7:45 PM_____ ,today/tonight and every 6 hours afterwards as needed for pain management, do not take any Tylenol containing products until this time. Your first dose of Tylenol was given at ______1:45 PM_____. Do not exceed more than 4000mg of Tylenol in one 24 hour setting. If no contraindications, you may alternate with Ibuprofen 3 hours after dose of Tylenol. Ibuprofen can be taken every 6 hours.

## 2022-04-13 NOTE — ASU DISCHARGE PLAN (ADULT/PEDIATRIC) - CARE PROVIDER_API CALL
[facility],   [address] [place] [address]  Phone: [phone]  Fax: (   )    -  Follow Up Time:

## 2022-04-14 LAB — HPV HIGH+LOW RISK DNA PNL CVX: SIGNIFICANT CHANGE UP

## 2022-04-18 LAB — CYTOLOGY SPEC DOC CYTO: SIGNIFICANT CHANGE UP

## 2022-04-21 LAB — SURGICAL PATHOLOGY STUDY: SIGNIFICANT CHANGE UP

## 2022-04-29 ENCOUNTER — APPOINTMENT (OUTPATIENT)
Dept: OBGYN | Facility: CLINIC | Age: 32
End: 2022-04-29

## 2022-05-05 ENCOUNTER — OUTPATIENT (OUTPATIENT)
Dept: OUTPATIENT SERVICES | Facility: HOSPITAL | Age: 32
LOS: 1 days | End: 2022-05-05
Payer: SELF-PAY

## 2022-05-05 ENCOUNTER — LABORATORY RESULT (OUTPATIENT)
Age: 32
End: 2022-05-05

## 2022-05-05 ENCOUNTER — APPOINTMENT (OUTPATIENT)
Dept: OBGYN | Facility: CLINIC | Age: 32
End: 2022-05-05

## 2022-05-05 VITALS
BODY MASS INDEX: 22.06 KG/M2 | HEIGHT: 63 IN | DIASTOLIC BLOOD PRESSURE: 84 MMHG | SYSTOLIC BLOOD PRESSURE: 120 MMHG | WEIGHT: 124.5 LBS

## 2022-05-05 DIAGNOSIS — N76.0 ACUTE VAGINITIS: ICD-10-CM

## 2022-05-05 DIAGNOSIS — Z98.891 HISTORY OF UTERINE SCAR FROM PREVIOUS SURGERY: Chronic | ICD-10-CM

## 2022-05-05 DIAGNOSIS — Z00.00 ENCOUNTER FOR GENERAL ADULT MEDICAL EXAMINATION W/OUT ABNORMAL FINDINGS: ICD-10-CM

## 2022-05-05 DIAGNOSIS — Z98.890 OTHER SPECIFIED POSTPROCEDURAL STATES: ICD-10-CM

## 2022-05-05 PROCEDURE — 88141 CYTOPATH C/V INTERPRET: CPT

## 2022-05-05 PROCEDURE — 87624 HPV HI-RISK TYP POOLED RSLT: CPT

## 2022-05-05 PROCEDURE — 88175 CYTOPATH C/V AUTO FLUID REDO: CPT

## 2022-05-05 PROCEDURE — G0463: CPT

## 2022-05-05 PROCEDURE — 99213 OFFICE O/P EST LOW 20 MIN: CPT | Mod: GC

## 2022-05-06 LAB — HPV HIGH+LOW RISK DNA PNL CVX: SIGNIFICANT CHANGE UP

## 2022-05-09 NOTE — PLAN
[FreeTextEntry1] : - doing well, cleared for activity\par - pelvic rest x 4 weeks\par - pathology reviewed\par - explained irregular bleeding normal in first 6 months of Mirena insertion\par - pap smear re-collected\par - RTC for annual exam\par \par MERI March, PGY-3\par d/w Dr. Guerin\par \par MIGS FELLOW ADDENDUM\par I have seen the read the above note, and agree with the resident's assessment and plan.\par -f/u cotesting today\par -routine GYN care \par \par CWChan, PGY6\par \par

## 2022-05-09 NOTE — HISTORY OF PRESENT ILLNESS
[Pain is well-controlled] : pain is well-controlled [Vaginal Bleeding] : vaginal bleeding [Mild] : mild vaginal bleeding [Normal] : normal [Pathology reviewed] : pathology reviewed [Fever] : no fever [Chills] : no chills [Nausea] : no nausea [Vomiting] : no vomiting [Diarrhea] : no diarrhea [Pelvic Pressure] : no pelvic pressure [Dysuria] : no dysuria [Vaginal Discharge] : no vaginal discharge [Constipation] : no constipation [Discharge] : absent of discharge [de-identified] : IUD strings visualized; pap smear collected (transformation zone visualized, friable)

## 2022-05-10 LAB — CYTOLOGY SPEC DOC CYTO: SIGNIFICANT CHANGE UP

## 2022-06-13 ENCOUNTER — APPOINTMENT (OUTPATIENT)
Dept: OBGYN | Facility: CLINIC | Age: 32
End: 2022-06-13
Payer: COMMERCIAL

## 2022-06-13 ENCOUNTER — LABORATORY RESULT (OUTPATIENT)
Age: 32
End: 2022-06-13

## 2022-06-13 ENCOUNTER — OUTPATIENT (OUTPATIENT)
Dept: OUTPATIENT SERVICES | Facility: HOSPITAL | Age: 32
LOS: 1 days | End: 2022-06-13
Payer: SELF-PAY

## 2022-06-13 VITALS — SYSTOLIC BLOOD PRESSURE: 120 MMHG | BODY MASS INDEX: 22.32 KG/M2 | WEIGHT: 126 LBS | DIASTOLIC BLOOD PRESSURE: 80 MMHG

## 2022-06-13 DIAGNOSIS — Z98.891 HISTORY OF UTERINE SCAR FROM PREVIOUS SURGERY: Chronic | ICD-10-CM

## 2022-06-13 DIAGNOSIS — N76.0 ACUTE VAGINITIS: ICD-10-CM

## 2022-06-13 PROCEDURE — G0463: CPT

## 2022-06-13 PROCEDURE — 88175 CYTOPATH C/V AUTO FLUID REDO: CPT

## 2022-06-13 PROCEDURE — 99213 OFFICE O/P EST LOW 20 MIN: CPT | Mod: 25

## 2022-06-13 PROCEDURE — 87491 CHLMYD TRACH DNA AMP PROBE: CPT

## 2022-06-13 PROCEDURE — 88141 CYTOPATH C/V INTERPRET: CPT

## 2022-06-13 PROCEDURE — 87591 N.GONORRHOEAE DNA AMP PROB: CPT

## 2022-06-13 PROCEDURE — T1013: CPT

## 2022-06-13 PROCEDURE — 87624 HPV HI-RISK TYP POOLED RSLT: CPT

## 2022-06-13 NOTE — HISTORY OF PRESENT ILLNESS
[FreeTextEntry1] : 33yo P1 s/p D&C hysteroscopy/ mirena insertion 4/2022 for AUB presents for rpt pap (unsat) and to report irreg bleeding / spotting since insertion. Denies pain.

## 2022-06-13 NOTE — PHYSICAL EXAM
[Labia Majora] : normal [Labia Minora] : normal [Pink Rugae] : pink rugae [Scant] : There was scant vaginal bleeding [IUD String] : an IUD string was noted [Normal] : normal [Tenderness] : nontender [Uterine Adnexae] : normal

## 2022-06-13 NOTE — DISCUSSION/SUMMARY
[FreeTextEntry1] : 33yo P1 s/p D&C / hysteroscopy with Mirena Insertion for AUB-  here for rpt pap (unsat) and to discuss irreg bleeding since IUD insertion\par - [ ]pap/ hpv collected\par - IUS strings visible/ non tender\par - reassured pt, irreg bleeding up to 6 months with Mirena.  Pt to keep log-  call for heavy vb.  If still bleeding/ spotting next month will consider addition of OCPs x 2-3 months to normalize .\par \par RTC for idris/ prn if bleeding persists. \par Sheyla domingo PAC

## 2022-06-13 NOTE — REASON FOR VISIT
[Follow-Up] : a follow-up evaluation of [[employer] ] : provided by [employer]   [Time Spent: ____ minutes] : Total time spent using  services: [unfilled] minutes. The patient's primary language is not English thus required  services. [Interpreters_IDNumber] : INT# [id number] [TWNoteComboBox1] : Uzbek

## 2022-06-14 LAB
C TRACH RRNA SPEC QL NAA+PROBE: SIGNIFICANT CHANGE UP
HPV HIGH+LOW RISK DNA PNL CVX: SIGNIFICANT CHANGE UP
N GONORRHOEA RRNA SPEC QL NAA+PROBE: SIGNIFICANT CHANGE UP
SPECIMEN SOURCE: SIGNIFICANT CHANGE UP

## 2022-06-16 LAB — CYTOLOGY SPEC DOC CYTO: SIGNIFICANT CHANGE UP

## 2022-06-20 ENCOUNTER — OUTPATIENT (OUTPATIENT)
Dept: OUTPATIENT SERVICES | Facility: HOSPITAL | Age: 32
LOS: 1 days | End: 2022-06-20
Payer: SELF-PAY

## 2022-06-20 ENCOUNTER — APPOINTMENT (OUTPATIENT)
Dept: OBGYN | Facility: CLINIC | Age: 32
End: 2022-06-20
Payer: COMMERCIAL

## 2022-06-20 ENCOUNTER — LABORATORY RESULT (OUTPATIENT)
Age: 32
End: 2022-06-20

## 2022-06-20 VITALS — DIASTOLIC BLOOD PRESSURE: 86 MMHG | SYSTOLIC BLOOD PRESSURE: 138 MMHG | WEIGHT: 130 LBS | BODY MASS INDEX: 23.03 KG/M2

## 2022-06-20 DIAGNOSIS — Z01.419 ENCOUNTER FOR GYNECOLOGICAL EXAMINATION (GENERAL) (ROUTINE) WITHOUT ABNORMAL FINDINGS: ICD-10-CM

## 2022-06-20 DIAGNOSIS — N76.0 ACUTE VAGINITIS: ICD-10-CM

## 2022-06-20 DIAGNOSIS — Z98.891 HISTORY OF UTERINE SCAR FROM PREVIOUS SURGERY: Chronic | ICD-10-CM

## 2022-06-20 DIAGNOSIS — Z01.419 ENCOUNTER FOR GYNECOLOGICAL EXAMINATION (GENERAL) (ROUTINE) W/OUT ABNORMAL FINDINGS: ICD-10-CM

## 2022-06-20 DIAGNOSIS — N94.6 DYSMENORRHEA, UNSPECIFIED: ICD-10-CM

## 2022-06-20 PROCEDURE — G0463: CPT

## 2022-06-20 PROCEDURE — 87624 HPV HI-RISK TYP POOLED RSLT: CPT

## 2022-06-20 PROCEDURE — 99213 OFFICE O/P EST LOW 20 MIN: CPT | Mod: 25

## 2022-06-20 RX ORDER — NAPROXEN 500 MG/1
500 TABLET ORAL
Qty: 30 | Refills: 1 | Status: ACTIVE | COMMUNITY
Start: 2022-06-20 | End: 1900-01-01

## 2022-06-21 LAB — HPV HIGH+LOW RISK DNA PNL CVX: SIGNIFICANT CHANGE UP

## 2022-06-23 LAB — CYTOLOGY SPEC DOC CYTO: SIGNIFICANT CHANGE UP

## 2022-06-28 DIAGNOSIS — Z01.419 ENCOUNTER FOR GYNECOLOGICAL EXAMINATION (GENERAL) (ROUTINE) WITHOUT ABNORMAL FINDINGS: ICD-10-CM

## 2022-06-28 DIAGNOSIS — N94.6 DYSMENORRHEA, UNSPECIFIED: ICD-10-CM

## 2024-01-17 NOTE — ASU PREOP CHECKLIST - TYPE OF SOLUTION
2024      HPI:     Name: Braydon Branch  YOB: 1965    CC: Patient is a 58 y.o. female who is seen in consultation from Aniceto Srivastava MD   for evaluation of  incontinence and \"gyno problems\" .     HPI:  Patient presents on referral for evaluation of pelvic organ prolapse, urinary incontinence.      She reports urinary incontinence for about 5 years.  She is currently on Myrbetriq which has been very helpful, however she is still bothered by leakage with cough.    She drinks about 80 oz of fluid, 4 caffeinated drinks included    She has felt a concern for prolapse for about 3 years.  Advised of prolapse, does not desire pessary    Her PMH was reviewed at length and she has multiple co morbidities.    She has recently moved to [place] and is establishing care with multiple physicians.  She has had a hysterectomy but does report an abnormal vaginal smear in May of which she is to have follow up in 6 months. She requests a referral for Gyn today    Narrative  [facility] by [facility]       Consult[facility] in Laboratory Medicine       [address] [place] [address]       Tel: [phone]         Fax: [phone]         Surgical Pathology Consultation          Patient Name:BRAYDON BRANCH:1965 (Age: 57)Gender:FTaken:2023Reported:hysician(s):Melody Jolley N.P. ([phone])Copy To:Accession #:[id number]. Rec. #:[id number]: #[id number]      Final Pathologic Diagnosis  1.  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       No evidence of malignancy or high-grade dysplasia (see comment).    2.  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       No evidence of malignancy or high-grade dysplasia (see comment).    Comment: Immunohistochemical analysis is performed with adequate controls, revealing that the lesional 
NS

## (undated) DEVICE — FLUENT FMS PROCEDURE KIT

## (undated) DEVICE — SPECIMEN CONTAINER 100ML

## (undated) DEVICE — SOL IRR BAG NS 0.9% 3000ML

## (undated) DEVICE — SOL IRR GLYCINE 1.5% 3000L

## (undated) DEVICE — SOL IRR POUR NS 0.9% 500ML

## (undated) DEVICE — ELCTR HF RESECTION LOOP ANGLED 22.5FR

## (undated) DEVICE — GLV 7 PROTEXIS (WHITE)

## (undated) DEVICE — DRAPE LIGHT HANDLE COVER (GREEN)

## (undated) DEVICE — TUBING STRYKER HYSTEROSCOPY INFLOW OUTFLOW

## (undated) DEVICE — WARMING BLANKET UPPER ADULT

## (undated) DEVICE — MYOSURE SCOPE SEAL

## (undated) DEVICE — PACK LITHOTOMY

## (undated) DEVICE — DRAPE 1/2 SHEET 40X57"

## (undated) DEVICE — GLV 7.5 PROTEXIS (WHITE)